# Patient Record
Sex: MALE | Race: WHITE | URBAN - METROPOLITAN AREA
[De-identification: names, ages, dates, MRNs, and addresses within clinical notes are randomized per-mention and may not be internally consistent; named-entity substitution may affect disease eponyms.]

---

## 2017-05-10 RX ORDER — AMLODIPINE AND BENAZEPRIL HYDROCHLORIDE 5; 40 MG/1; MG/1
CAPSULE ORAL
Qty: 90 CAP | Refills: 0 | Status: SHIPPED | OUTPATIENT
Start: 2017-05-10 | End: 2017-08-10 | Stop reason: SDUPTHER

## 2017-05-25 DIAGNOSIS — I10 ESSENTIAL HYPERTENSION WITH GOAL BLOOD PRESSURE LESS THAN 140/90: ICD-10-CM

## 2017-05-25 RX ORDER — METOPROLOL SUCCINATE 50 MG/1
TABLET, EXTENDED RELEASE ORAL
Qty: 90 TAB | Refills: 0 | Status: SHIPPED | OUTPATIENT
Start: 2017-05-25 | End: 2017-08-28 | Stop reason: SDUPTHER

## 2017-05-25 NOTE — TELEPHONE ENCOUNTER
He is due in for annual CHP and fasting lab with next open slot. He can have lab and CHP done on the same AM visit.  He was in the office in November and his blood pressure was 149/89

## 2017-08-10 RX ORDER — AMLODIPINE AND BENAZEPRIL HYDROCHLORIDE 5; 40 MG/1; MG/1
CAPSULE ORAL
Qty: 90 CAP | Refills: 0 | Status: SHIPPED | OUTPATIENT
Start: 2017-08-10 | End: 2017-11-08 | Stop reason: SDUPTHER

## 2017-08-28 DIAGNOSIS — I10 ESSENTIAL HYPERTENSION WITH GOAL BLOOD PRESSURE LESS THAN 140/90: ICD-10-CM

## 2017-08-29 RX ORDER — METOPROLOL SUCCINATE 50 MG/1
TABLET, EXTENDED RELEASE ORAL
Qty: 90 TAB | Refills: 0 | Status: SHIPPED | OUTPATIENT
Start: 2017-08-29 | End: 2017-11-30 | Stop reason: SDUPTHER

## 2017-09-18 PROBLEM — R73.03 PREDIABETES: Status: ACTIVE | Noted: 2017-09-18

## 2017-10-02 ENCOUNTER — LAB ONLY (OUTPATIENT)
Dept: FAMILY MEDICINE CLINIC | Age: 57
End: 2017-10-02

## 2017-10-02 DIAGNOSIS — D64.9 ANEMIA, UNSPECIFIED TYPE: ICD-10-CM

## 2017-10-02 DIAGNOSIS — Z12.5 PROSTATE CANCER SCREENING: ICD-10-CM

## 2017-10-02 DIAGNOSIS — I10 ESSENTIAL HYPERTENSION: Primary | ICD-10-CM

## 2017-10-02 DIAGNOSIS — R73.03 PREDIABETES: ICD-10-CM

## 2017-10-02 DIAGNOSIS — E55.9 VITAMIN D DEFICIENCY: ICD-10-CM

## 2017-10-02 NOTE — LETTER
10/9/2017 8:07 AM 
 
Mr. John Menchaca [id number] Dear Zbigniew Narayanan.: 
 
Please find your most recent results below. Resulted Orders VITAMIN D, 25 HYDROXY Result Value Ref Range VITAMIN D, 25-HYDROXY 35.0 30.0 - 100.0 ng/mL Comment:  
   Vitamin D deficiency has been defined by the [address] [place] [address] practice guideline as a 
level of serum 25-OH vitamin D less than 20 ng/mL (1,2). The Endocrine Society went on to further define vitamin D 
insufficiency as a level between 21 and 29 ng/mL (2). 1. IOM ([place] of [employer]). 2010. Dietary reference 
   intakes for calcium and D. [address] [facility]: The 
   [place]. 2. Winter MF, Zheng WINTER, Lg LOVE, et al. 
   Evaluation, treatment, and prevention of vitamin D 
   deficiency: an Endocrine Society clinical practice 
   guideline. JCEM. 2011 Jul; 96(7):1911-30. Narrative Performed at:  [address]  [id number] : Dolly Martinez MD, Phone:  [phone] HEMOGLOBIN A1C WITH EAG Result Value Ref Range Hemoglobin A1c 5.3 4.8 - 5.6 % Comment:  
            Pre-diabetes: 5.7 - 6.4 Diabetes: >6.4 Glycemic control for adults with diabetes: <7.0 Estimated average glucose 105 mg/dL Narrative Performed at:  [address]  [id number] : Dolly Martinez MD, Phone:  [phone] FERRITIN Result Value Ref Range Ferritin 239 30 - 400 ng/mL Narrative Performed at:  [address]  [id number] : Dolly Martinez MD, Phone:  [phone] CBC WITH AUTOMATED DIFF Result Value Ref Range WBC 6.9 3.4 - 10.8 x10E3/uL  
 RBC 3.95 (L) 4.14 - 5.80 x10E6/uL HGB 12.8 12.6 - 17.7 g/dL HCT 36.7 (L) 37.5 - 51.0 %  MCV 93 79 - 97 fL  
 MCH 32.4 26.6 - 33.0 pg  
 MCHC 34.9 31.5 - 35.7 g/dL  
 RDW 13.5 12.3 - 15.4 % PLATELET 143 654 - 372 x10E3/uL NEUTROPHILS 66 Not Estab. % Lymphocytes 22 Not Estab. % MONOCYTES 11 Not Estab. % EOSINOPHILS 1 Not Estab. % BASOPHILS 0 Not Estab. %  
 ABS. NEUTROPHILS 4.5 1.4 - 7.0 x10E3/uL Abs Lymphocytes 1.5 0.7 - 3.1 x10E3/uL  
 ABS. MONOCYTES 0.7 0.1 - 0.9 x10E3/uL  
 ABS. EOSINOPHILS 0.1 0.0 - 0.4 x10E3/uL  
 ABS. BASOPHILS 0.0 0.0 - 0.2 x10E3/uL IMMATURE GRANULOCYTES 0 Not Estab. %  
 ABS. IMM. GRANS. 0.0 0.0 - 0.1 x10E3/uL Narrative Performed at:  [address]  [id number] : Moreno Banegas MD, Phone:  [phone] URINALYSIS W/ RFLX MICROSCOPIC Result Value Ref Range Specific Gravity 1.009 1.005 - 1.030  
 pH (UA) 6.0 5.0 - 7.5 Color Yellow Yellow Appearance Clear Clear Leukocyte Esterase Negative Negative Protein 1+ (A) Negative/Trace Glucose Negative Negative Ketone Negative Negative Blood Negative Negative Bilirubin Negative Negative Urobilinogen 0.2 0.2 - 1.0 mg/dL Nitrites Negative Negative Microscopic Examination See additional order Comment:  
   Microscopic was indicated and was performed. Narrative Performed at:  [address]  [id number] : Moerno Banegas MD, Phone:  [phone] TSH 3RD GENERATION Result Value Ref Range TSH 0.790 0.450 - 4.500 uIU/mL Narrative Performed at:  [address]  [id number] : Moreno Banegas MD, Phone:  [phone] PSA, DIAGNOSTIC (PROSTATE SPECIFIC AG) Result Value Ref Range Prostate Specific Ag 0.4 0.0 - 4.0 ng/mL Comment:  
   Roche ECLIA methodology.  
According to the American Urological Association, Serum PSA should 
decrease and remain at undetectable levels after radical 
 prostatectomy. The AUA defines biochemical recurrence as an initial 
PSA value 0.2 ng/mL or greater followed by a subsequent confirmatory PSA value 0.2 ng/mL or greater. Values obtained with different assay methods or kits cannot be used 
interchangeably. Results cannot be interpreted as absolute evidence 
of the presence or absence of malignant disease. Narrative Performed at:  [address]  [id number] : Leann Zazueta MD, Phone:  [phone] METABOLIC PANEL, COMPREHENSIVE Result Value Ref Range Glucose 107 (H) 65 - 99 mg/dL BUN 12 6 - 24 mg/dL Creatinine 0.97 0.76 - 1.27 mg/dL GFR est non-AA 86 >59 mL/min/1.73 GFR est AA 99 >59 mL/min/1.73  
 BUN/Creatinine ratio 12 9 - 20 Sodium 133 (L) 134 - 144 mmol/L Potassium 5.1 3.5 - 5.2 mmol/L Chloride 97 96 - 106 mmol/L  
 CO2 19 18 - 29 mmol/L Calcium 9.1 8.7 - 10.2 mg/dL Protein, total 7.0 6.0 - 8.5 g/dL Albumin 4.6 3.5 - 5.5 g/dL GLOBULIN, TOTAL 2.4 1.5 - 4.5 g/dL A-G Ratio 1.9 1.2 - 2.2 Bilirubin, total 0.5 0.0 - 1.2 mg/dL Alk. phosphatase 70 39 - 117 IU/L  
 AST (SGOT) 31 0 - 40 IU/L  
 ALT (SGPT) 29 0 - 44 IU/L Narrative Performed at:  [address]  [id number] : Leann Zazueta MD, Phone:  [phone] LIPID PANEL Result Value Ref Range Cholesterol, total 138 100 - 199 mg/dL Triglyceride 54 0 - 149 mg/dL HDL Cholesterol 79 >39 mg/dL VLDL, calculated 11 5 - 40 mg/dL LDL, calculated 48 0 - 99 mg/dL Narrative Performed at:  [address]  [id number] : Leann Zazueta MD, Phone:  [phone] MICROSCOPIC EXAMINATION Result Value Ref Range WBC 0-5 0 - 5 /hpf  
 RBC None seen 0 - 2 /hpf Epithelial cells 0-10 0 - 10 /hpf Casts None seen None seen /lpf Bacteria None seen None seen/Few Narrative Performed at:  [address]  [id number] : George Sexton MD, Phone:  [phone] CVD REPORT Result Value Ref Range INTERPRETATION Note Comment:  
   Supplement report is available. Narrative Performed at:  [address] [place] [address]  [id number] : Jossy Moore PhD, Phone:  [phone] RECOMMENDATIONS: 
 
Normal average blood sugar. Mildly decreased hematocrit with normal iron level. Proteinuria. The rest of your labs are ok. Please call me if you have any questions: [phone] Sincerely, Maxine Cowan MD

## 2017-10-03 PROBLEM — R73.03 PREDIABETES: Status: RESOLVED | Noted: 2017-09-18 | Resolved: 2017-10-03

## 2017-10-03 LAB
25(OH)D3+25(OH)D2 SERPL-MCNC: 35 NG/ML (ref 30–100)
ALBUMIN SERPL-MCNC: 4.6 G/DL (ref 3.5–5.5)
ALBUMIN/GLOB SERPL: 1.9 {RATIO} (ref 1.2–2.2)
ALP SERPL-CCNC: 70 IU/L (ref 39–117)
ALT SERPL-CCNC: 29 IU/L (ref 0–44)
APPEARANCE UR: CLEAR
AST SERPL-CCNC: 31 IU/L (ref 0–40)
BACTERIA #/AREA URNS HPF: NORMAL /[HPF]
BASOPHILS # BLD AUTO: 0 X10E3/UL (ref 0–0.2)
BASOPHILS NFR BLD AUTO: 0 %
BILIRUB SERPL-MCNC: 0.5 MG/DL (ref 0–1.2)
BILIRUB UR QL STRIP: NEGATIVE
BUN SERPL-MCNC: 12 MG/DL (ref 6–24)
BUN/CREAT SERPL: 12 (ref 9–20)
CALCIUM SERPL-MCNC: 9.1 MG/DL (ref 8.7–10.2)
CASTS URNS QL MICRO: NORMAL /LPF
CHLORIDE SERPL-SCNC: 97 MMOL/L (ref 96–106)
CHOLEST SERPL-MCNC: 138 MG/DL (ref 100–199)
CO2 SERPL-SCNC: 19 MMOL/L (ref 18–29)
COLOR UR: YELLOW
CREAT SERPL-MCNC: 0.97 MG/DL (ref 0.76–1.27)
EOSINOPHIL # BLD AUTO: 0.1 X10E3/UL (ref 0–0.4)
EOSINOPHIL NFR BLD AUTO: 1 %
EPI CELLS #/AREA URNS HPF: NORMAL /HPF
ERYTHROCYTE [DISTWIDTH] IN BLOOD BY AUTOMATED COUNT: 13.5 % (ref 12.3–15.4)
EST. AVERAGE GLUCOSE BLD GHB EST-MCNC: 105 MG/DL
FERRITIN SERPL-MCNC: 239 NG/ML (ref 30–400)
GLOBULIN SER CALC-MCNC: 2.4 G/DL (ref 1.5–4.5)
GLUCOSE SERPL-MCNC: 107 MG/DL (ref 65–99)
GLUCOSE UR QL: NEGATIVE
HBA1C MFR BLD: 5.3 % (ref 4.8–5.6)
HCT VFR BLD AUTO: 36.7 % (ref 37.5–51)
HDLC SERPL-MCNC: 79 MG/DL
HGB BLD-MCNC: 12.8 G/DL (ref 12.6–17.7)
HGB UR QL STRIP: NEGATIVE
IMM GRANULOCYTES # BLD: 0 X10E3/UL (ref 0–0.1)
IMM GRANULOCYTES NFR BLD: 0 %
INTERPRETATION, 910389: NORMAL
KETONES UR QL STRIP: NEGATIVE
LDLC SERPL CALC-MCNC: 48 MG/DL (ref 0–99)
LEUKOCYTE ESTERASE UR QL STRIP: NEGATIVE
LYMPHOCYTES # BLD AUTO: 1.5 X10E3/UL (ref 0.7–3.1)
LYMPHOCYTES NFR BLD AUTO: 22 %
MCH RBC QN AUTO: 32.4 PG (ref 26.6–33)
MCHC RBC AUTO-ENTMCNC: 34.9 G/DL (ref 31.5–35.7)
MCV RBC AUTO: 93 FL (ref 79–97)
MICRO URNS: ABNORMAL
MONOCYTES # BLD AUTO: 0.7 X10E3/UL (ref 0.1–0.9)
MONOCYTES NFR BLD AUTO: 11 %
NEUTROPHILS # BLD AUTO: 4.5 X10E3/UL (ref 1.4–7)
NEUTROPHILS NFR BLD AUTO: 66 %
NITRITE UR QL STRIP: NEGATIVE
PH UR STRIP: 6 [PH] (ref 5–7.5)
PLATELET # BLD AUTO: 247 X10E3/UL (ref 150–379)
POTASSIUM SERPL-SCNC: 5.1 MMOL/L (ref 3.5–5.2)
PROT SERPL-MCNC: 7 G/DL (ref 6–8.5)
PROT UR QL STRIP: ABNORMAL
PSA SERPL-MCNC: 0.4 NG/ML (ref 0–4)
RBC # BLD AUTO: 3.95 X10E6/UL (ref 4.14–5.8)
RBC #/AREA URNS HPF: NORMAL /HPF
SODIUM SERPL-SCNC: 133 MMOL/L (ref 134–144)
SP GR UR: 1.01 (ref 1–1.03)
TRIGL SERPL-MCNC: 54 MG/DL (ref 0–149)
TSH SERPL DL<=0.005 MIU/L-ACNC: 0.79 UIU/ML (ref 0.45–4.5)
UROBILINOGEN UR STRIP-MCNC: 0.2 MG/DL (ref 0.2–1)
VLDLC SERPL CALC-MCNC: 11 MG/DL (ref 5–40)
WBC # BLD AUTO: 6.9 X10E3/UL (ref 3.4–10.8)
WBC #/AREA URNS HPF: NORMAL /HPF

## 2017-10-09 ENCOUNTER — OFFICE VISIT (OUTPATIENT)
Dept: FAMILY MEDICINE CLINIC | Age: 57
End: 2017-10-09

## 2017-10-09 VITALS
BODY MASS INDEX: 23.58 KG/M2 | RESPIRATION RATE: 16 BRPM | HEART RATE: 84 BPM | SYSTOLIC BLOOD PRESSURE: 158 MMHG | TEMPERATURE: 98.5 F | WEIGHT: 138.1 LBS | OXYGEN SATURATION: 97 % | HEIGHT: 64 IN | DIASTOLIC BLOOD PRESSURE: 81 MMHG

## 2017-10-09 DIAGNOSIS — I10 ELEVATED BLOOD PRESSURE READING IN OFFICE WITH DIAGNOSIS OF HYPERTENSION: ICD-10-CM

## 2017-10-09 DIAGNOSIS — N52.9 ERECTILE DYSFUNCTION, UNSPECIFIED ERECTILE DYSFUNCTION TYPE: ICD-10-CM

## 2017-10-09 DIAGNOSIS — F17.210 CIGARETTE SMOKER: ICD-10-CM

## 2017-10-09 DIAGNOSIS — E55.9 VITAMIN D DEFICIENCY: ICD-10-CM

## 2017-10-09 DIAGNOSIS — I10 ESSENTIAL HYPERTENSION: ICD-10-CM

## 2017-10-09 DIAGNOSIS — R09.89 LEFT CAROTID BRUIT: ICD-10-CM

## 2017-10-09 DIAGNOSIS — D64.9 ANEMIA, UNSPECIFIED TYPE: ICD-10-CM

## 2017-10-09 DIAGNOSIS — Z00.00 PHYSICAL EXAM: Primary | ICD-10-CM

## 2017-10-09 NOTE — PROGRESS NOTES
Hypertension Sister      Social History   Substance Use Topics    Smoking status: Current Every Day Smoker     Packs/day: 1.00     Types: Cigarettes    Smokeless tobacco: Never Used      Comment: may have to quit due to the cost    Alcohol use 12.0 oz/week     24 Cans of beer per week      Lab Results  Component Value Date/Time   WBC 6.9 10/02/2017 08:46 AM   HGB 12.8 10/02/2017 08:46 AM   HCT 36.7 10/02/2017 08:46 AM   PLATELET 060 63/19/2768 08:46 AM   MCV 93 10/02/2017 08:46 AM     Lab Results  Component Value Date/Time   Hemoglobin A1c 5.3 10/02/2017 08:46 AM   Hemoglobin A1c 5.8 06/08/2016 09:33 AM   Hemoglobin A1c 5.9 05/18/2016 09:35 AM   Glucose 107 10/02/2017 08:46 AM   LDL, calculated 48 10/02/2017 08:46 AM   Creatinine 0.97 10/02/2017 08:46 AM      Lab Results  Component Value Date/Time   Cholesterol, total 138 10/02/2017 08:46 AM   HDL Cholesterol 79 10/02/2017 08:46 AM   LDL, calculated 48 10/02/2017 08:46 AM   Triglyceride 54 10/02/2017 08:46 AM   CHOL/HDL Ratio 3.2 10/09/2009 08:00 AM   Lab Results  Component Value Date/Time   ALT (SGPT) 29 10/02/2017 08:46 AM   AST (SGOT) 31 10/02/2017 08:46 AM   Alk.  phosphatase 70 10/02/2017 08:46 AM   Bilirubin, total 0.5 10/02/2017 08:46 AM   Albumin 4.6 10/02/2017 08:46 AM   Protein, total 7.0 10/02/2017 08:46 AM   PLATELET 328 91/15/5157 08:46 AM       Lab Results  Component Value Date/Time   GFR est non-AA 86 10/02/2017 08:46 AM   GFR est AA 99 10/02/2017 08:46 AM   Creatinine 0.97 10/02/2017 08:46 AM   BUN 12 10/02/2017 08:46 AM   Sodium 133 10/02/2017 08:46 AM   Potassium 5.1 10/02/2017 08:46 AM   Chloride 97 10/02/2017 08:46 AM   CO2 19 10/02/2017 08:46 AM   Lab Results  Component Value Date/Time   TSH 0.790 10/02/2017 08:46 AM      Lab Results   Component Value Date/Time    Glucose 107 10/02/2017 08:46 AM      Lab Results   Component Value Date/Time    Prostate Specific Ag 0.4 10/02/2017 08:46 AM    Prostate Specific Ag 0.4 05/18/2016 09:35 AM Prostate Specific Ag 0.6 12/29/2014 09:04 AM    Prostate Specific Ag 0.4 10/09/2009 08:00 AM        Review of Systems   Constitutional: Negative. HENT: Negative. Eyes: Negative. Respiratory: Positive for cough. Cardiovascular: Negative. Gastrointestinal: Negative. Genitourinary: Negative. Musculoskeletal: Positive for joint pain. Right hip. Skin: Negative. Neurological: Negative. Endo/Heme/Allergies: Negative. Psychiatric/Behavioral: Negative. Physical Exam   Vitals:    10/09/17 1019   BP: 158/81   Pulse: 84   Resp: 16   Temp: 98.5 °F (36.9 °C)   TempSrc: Oral   SpO2: 97%   Weight: 138 lb 1.6 oz (62.6 kg)   Height: 5' 3.5\" (1.613 m)       General  alert, cooperative, no distress, appears stated age   Head  Normocephalic, without obvious abnormality, atraumatic   Eyes  conjunctivae/corneas clear. PERRL. Fundi pale from prev cataract surg. Ears  Canals and TMs clear   Nose Passages patent. Mucosa normal. No drainage or sinus tenderness. Throat Lips, mucosa, and tongue normal. Full upper, partial lower, gums normal.  Post pharynx erythem. Neck supple, nontender, no adenopathy, thyroid: not enlarged, no masses/nodules, left carotid bruit   Back   symmetric, no curvature. FROM. No CVA tenderness   Lungs   clear to auscultation bilaterally   Chest wall  no tenderness   Heart  regular rate and rhythm, no murmur, click, rub or gallop   Abdomen   soft, non-tender. Bowel sounds normal. No masses,  No organomegaly   Genitalia  Testes descended bilat w/o masses. No hernias   Rectal  Pt declined   Extremities extremities normal, atraumatic, no cyanosis or edema   Pulses 1-2+ and symmetric   Skin No rashes or lesions       Neurologic Romberg neg, nml heel, toe and Tandem gait. DTRs 2-3+ symmetric         ASSESSMENT and PLAN    ICD-10-CM ICD-9-CM    1. Physical exam Z00.00 V70.9    2. Anemia, unspecified type D64.9 285.9    3. Vitamin D deficiency E55.9 268.9    4.  Erectile dysfunction, unspecified erectile dysfunction type N52.9 607.84    5. Cigarette smoker F17.210 305.1    6. Essential hypertension I10 401.9    7. Elevated blood pressure reading in office with diagnosis of hypertension I10 401.9    8. Left carotid bruit R09.89 785.9 DUPLEX CAROTID BILATERAL   Advised to begin daily LS ASA. Follow-up Disposition:  Return in about 1 year (around 10/9/2018) for [facility] and labs.

## 2017-10-09 NOTE — LETTER
10/9/2017 10:23 AM 
 
Mr. Mariah Randolph. 
[facility] [phone] Lab Only on 10/02/2017 Component Date Value Ref Range Status  VITAMIN D, 25-HYDROXY 10/02/2017 35.0  30.0 - 100.0 ng/mL Final  
 Hemoglobin A1c 10/02/2017 5.3  4.8 - 5.6 % Final  
 Estimated average glucose 10/02/2017 105  mg/dL Final  
 Ferritin 10/02/2017 239  30 - 400 ng/mL Final  
 WBC 10/02/2017 6.9  3.4 - 10.8 x10E3/uL Final  
 RBC 10/02/2017 3.95* 4.14 - 5.80 x10E6/uL Final  
 HGB 10/02/2017 12.8  12.6 - 17.7 g/dL Final  
 HCT 10/02/2017 36.7* 37.5 - 51.0 % Final  
 MCV 10/02/2017 93  79 - 97 fL Final  
 MCH 10/02/2017 32.4  26.6 - 33.0 pg Final  
 MCHC 10/02/2017 34.9  31.5 - 35.7 g/dL Final  
 RDW 10/02/2017 13.5  12.3 - 15.4 % Final  
 PLATELET 80/24/7049 016  150 - 379 x10E3/uL Final  
 NEUTROPHILS 10/02/2017 66  Not Estab. % Final  
 Lymphocytes 10/02/2017 22  Not Estab. % Final  
 MONOCYTES 10/02/2017 11  Not Estab. % Final  
 EOSINOPHILS 10/02/2017 1  Not Estab. % Final  
 BASOPHILS 10/02/2017 0  Not Estab. % Final  
 ABS. NEUTROPHILS 10/02/2017 4.5  1.4 - 7.0 x10E3/uL Final  
 Abs Lymphocytes 10/02/2017 1.5  0.7 - 3.1 x10E3/uL Final  
 ABS. MONOCYTES 10/02/2017 0.7  0.1 - 0.9 x10E3/uL Final  
 ABS. EOSINOPHILS 10/02/2017 0.1  0.0 - 0.4 x10E3/uL Final  
 ABS. BASOPHILS 10/02/2017 0.0  0.0 - 0.2 x10E3/uL Final  
 IMMATURE GRANULOCYTES 10/02/2017 0  Not Estab. % Final  
 ABS. IMM. GRANS.  10/02/2017 0.0  0.0 - 0.1 x10E3/uL Final  
 Specific Gravity 10/02/2017 1.009  1.005 - 1.030 Final  
 pH (UA) 10/02/2017 6.0  5.0 - 7.5 Final  
 Color 10/02/2017 Yellow  Yellow Final  
 Appearance 10/02/2017 Clear  Clear Final  
 Leukocyte Esterase 10/02/2017 Negative  Negative Final  
 Protein 10/02/2017 1+* Negative/Trace Final  
 Glucose 10/02/2017 Negative  Negative Final  
 Ketone 10/02/2017 Negative  Negative Final  
 Blood 10/02/2017 Negative  Negative Final  
  Bilirubin 10/02/2017 Negative  Negative Final  
 Urobilinogen 10/02/2017 0.2  0.2 - 1.0 mg/dL Final  
 Nitrites 10/02/2017 Negative  Negative Final  
 Microscopic Examination 10/02/2017 See additional order   Final  
 TSH 10/02/2017 0.790  0.450 - 4.500 uIU/mL Final  
 Prostate Specific Ag 10/02/2017 0.4  0.0 - 4.0 ng/mL Final  
 Glucose 10/02/2017 107* 65 - 99 mg/dL Final  
 BUN 10/02/2017 12  6 - 24 mg/dL Final  
 Creatinine 10/02/2017 0.97  0.76 - 1.27 mg/dL Final  
 GFR est non-AA 10/02/2017 86  >59 mL/min/1.73 Final  
 GFR est AA 10/02/2017 99  >59 mL/min/1.73 Final  
 BUN/Creatinine ratio 10/02/2017 12  9 - 20 Final  
 Sodium 10/02/2017 133* 134 - 144 mmol/L Final  
 Potassium 10/02/2017 5.1  3.5 - 5.2 mmol/L Final  
 Chloride 10/02/2017 97  96 - 106 mmol/L Final  
 CO2 10/02/2017 19  18 - 29 mmol/L Final  
 Calcium 10/02/2017 9.1  8.7 - 10.2 mg/dL Final  
 Protein, total 10/02/2017 7.0  6.0 - 8.5 g/dL Final  
 Albumin 10/02/2017 4.6  3.5 - 5.5 g/dL Final  
 GLOBULIN, TOTAL 10/02/2017 2.4  1.5 - 4.5 g/dL Final  
 A-G Ratio 10/02/2017 1.9  1.2 - 2.2 Final  
 Bilirubin, total 10/02/2017 0.5  0.0 - 1.2 mg/dL Final  
 Alk. phosphatase 10/02/2017 70  39 - 117 IU/L Final  
 AST (SGOT) 10/02/2017 31  0 - 40 IU/L Final  
 ALT (SGPT) 10/02/2017 29  0 - 44 IU/L Final  
 Cholesterol, total 10/02/2017 138  100 - 199 mg/dL Final  
 Triglyceride 10/02/2017 54  0 - 149 mg/dL Final  
 HDL Cholesterol 10/02/2017 79  >39 mg/dL Final  
 VLDL, calculated 10/02/2017 11  5 - 40 mg/dL Final  
 LDL, calculated 10/02/2017 48  0 - 99 mg/dL Final  
 WBC 10/02/2017 0-5  0 - 5 /hpf Final  
 RBC 10/02/2017 None seen  0 - 2 /hpf Final  
 Epithelial cells 10/02/2017 0-10  0 - 10 /hpf Final  
 Casts 10/02/2017 None seen  None seen /lpf Final  
 Bacteria 10/02/2017 None seen  None seen/Few Final  
 INTERPRETATION 10/02/2017 Note   Final  
 
 
 
 
Sincerely, Ramin Roque MD

## 2017-10-09 NOTE — PROGRESS NOTES
Chief Complaint   Patient presents with    Complete Physical     1. Have you been to the ER, urgent care clinic since your last visit? Hospitalized since your last visit? Yes When: 9/17 [facility] Reason for visit: Bronchitis    2. Have you seen or consulted any other health care providers outside of the [address] [place] [address] since your last visit? Include any pap smears or colon screening. No     Advance Care Planning information reviewed and given to the patient. I have reviewed Health Maintenance with the patient and updated. Complete Physical Exam Male  Pre-Visit Questions:    1. Do you follow a low fat or low salt diet ? y  2. Do you follow an exercise program? y  3. Have you had your tetanus booster in the last 10 years? y  3. Have you ever had a Pneumonia vaccine? n  5. Do you smoke?y   6. Do you consider yourself overweight? n  7. Do you perform Testicular self exam?n  8. Is there a family history of CAD< age 48? Maybe his Father  5. Is there a family history of Cancer? n  10. Do you have any Cancer risks? n  11. Have you had a colonoscopy? y  15. Have you been to your eye doctor past year?   n  15. Have you been to your dentist in the last 6 months?  n  14. Have you had your flu shot for this season? n

## 2017-10-09 NOTE — PROGRESS NOTES
Letter sent with Dr. Behzad Jacob recommendations:   Normal average blood sugar. Mildly decreased hematocrit with normal Iron level. Proteinuria.   The rest of your labs are all O.K.

## 2017-10-09 NOTE — MR AVS SNAPSHOT
Visit Information Date & Time Provider Department Dept. Phone Encounter #  
 10/9/2017 10:20 AM Jared Cortes MD [facility] [phone] [id number] Follow-up Instructions Return in about 1 year (around 10/9/2018) for [facility] and labs. Upcoming Health Maintenance Date Due INFLUENZA AGE 9 TO ADULT 10/21/2017* COLONOSCOPY 11/3/2021 DTaP/Tdap/Td series (2 - Td) 6/28/2023 *Topic was postponed. The date shown is not the original due date. Allergies as of 10/9/2017  Review Complete On: 10/9/2017 By: Jared Cortes MD  
  
 Severity Noted Reaction Type Reactions Hydrodiuril  12/01/2009    Other (comments) Decreased sodium level Current Immunizations  Reviewed on 6/28/2013 Name Date  
 TD Vaccine 3/28/2003 Tdap 6/28/2013 Not reviewed this visit You Were Diagnosed With   
  
 Codes Comments Physical exam    -  Primary ICD-10-CM: Z00.00 ICD-9-CM: V70.9 Anemia, unspecified type     ICD-10-CM: D64.9 ICD-9-CM: 296. 9 Vitamin D deficiency     ICD-10-CM: E55.9 ICD-9-CM: 268.9 Erectile dysfunction, unspecified erectile dysfunction type     ICD-10-CM: N52.9 ICD-9-CM: 607.84 Cigarette smoker     ICD-10-CM: F17.210 ICD-9-CM: 305.1 Essential hypertension     ICD-10-CM: I10 
ICD-9-CM: 401.9 Elevated blood pressure reading in office with diagnosis of hypertension     ICD-10-CM: I10 
ICD-9-CM: 401.9 Vitals BP Pulse Temp Resp Height(growth percentile) Weight(growth percentile) 158/81 (BP 1 Location: Right arm, BP Patient Position: Sitting) 84 98.5 °F (36.9 °C) (Oral) 16 5' 3.5\" (1.613 m) 138 lb 1.6 oz (62.6 kg) SpO2 BMI Smoking Status 97% 24.08 kg/m2 Current Every Day Smoker Vitals History BMI and BSA Data Body Mass Index Body Surface Area 24.08 kg/m 2 1.67 m 2 Preferred Pharmacy Pharmacy Name Phone [facility] #[id number] Kody [address] [place] [address] [phone] Your Updated Medication List  
  
   
This list is accurate as of: 10/9/17 10:55 AM.  Always use your most recent med list. ADVIL 200 mg tablet Generic drug:  ibuprofen Take 400 mg by mouth as needed for Pain. amLODIPine-benazepril 5-40 mg per capsule Commonly known as:  LOTREL  
TAKE 1 CAP BY MOUTH DAILY. metoprolol succinate 50 mg XL tablet Commonly known as:  TOPROL-XL  
TAKE 1 TABLET BY MOUTH DAILY Follow-up Instructions Return in about 1 year (around 10/9/2018) for [facility] and labs. Introducing [facility]! Dear Jaycee Tobias: 
Thank you for requesting a "[employer] account. Our records indicate that you have previously registered for a "[employer]" account but its currently inactive. Please call our "[employer]" support line at [phone]. Additional Information If you have questions, please visit the Frequently Asked Questions section of the "[employer]" website at [email or web address] Remember, "[employer]" is NOT to be used for urgent needs. For medical emergencies, dial 911. Now available from your iPhone and Android! Please provide this summary of care documentation to your next provider. Your primary care clinician is listed as [id number] If you have any questions after today's visit, please call [phone].

## 2018-06-07 ENCOUNTER — OFFICE VISIT (OUTPATIENT)
Dept: FAMILY MEDICINE CLINIC | Age: 58
End: 2018-06-07

## 2018-06-07 VITALS
OXYGEN SATURATION: 99 % | HEART RATE: 90 BPM | RESPIRATION RATE: 18 BRPM | HEIGHT: 64 IN | TEMPERATURE: 98.4 F | WEIGHT: 137.9 LBS | DIASTOLIC BLOOD PRESSURE: 88 MMHG | SYSTOLIC BLOOD PRESSURE: 174 MMHG | BODY MASS INDEX: 23.54 KG/M2

## 2018-06-07 DIAGNOSIS — I10 ELEVATED BLOOD PRESSURE READING IN OFFICE WITH DIAGNOSIS OF HYPERTENSION: ICD-10-CM

## 2018-06-07 DIAGNOSIS — F17.210 CIGARETTE SMOKER: ICD-10-CM

## 2018-06-07 DIAGNOSIS — N52.9 ERECTILE DYSFUNCTION, UNSPECIFIED ERECTILE DYSFUNCTION TYPE: Primary | ICD-10-CM

## 2018-06-07 DIAGNOSIS — I10 ESSENTIAL HYPERTENSION: ICD-10-CM

## 2018-06-07 RX ORDER — AZITHROMYCIN 250 MG/1
500 TABLET, FILM COATED ORAL DAILY
Qty: 6 TAB | Refills: 0 | Status: SHIPPED | OUTPATIENT
Start: 2018-06-07 | End: 2018-06-10

## 2018-06-07 RX ORDER — SILDENAFIL 100 MG/1
50-100 TABLET, FILM COATED ORAL AS NEEDED
Qty: 10 TAB | Refills: 0 | Status: SHIPPED | OUTPATIENT
Start: 2018-06-07 | End: 2020-11-06

## 2018-06-07 NOTE — PROGRESS NOTES
Dustin Tirado. is a 59 y.o. male  Chief Complaint   Patient presents with    Medication Refill     [facility] [address] [facility] [address] [place] [address]     1. Have you been to the ER, urgent care clinic since your last visit? Hospitalized since your last visit? Yes [facility] on [address] [place] for swollen jaw 6-7 months ago, no problems since. Patient believes it may be connected to salted peanuts eaten night prior. Physician suggested may be related to BP med    2. Have you seen or consulted any other health care providers outside of the [address] [place] [address] since your last visit? Include any pap smears or colon screening. None other than above mentioned    Visit Vitals    /88 (BP 1 Location: Left arm, BP Patient Position: Sitting)    Pulse 90    Temp 98.4 °F (36.9 °C) (Oral)    Resp 18    Ht 5' 3.5\" (1.613 m)    Wt 137 lb 14.4 oz (62.6 kg)    SpO2 99%    BMI 24.04 kg/m2     BP elevated 161/119 on right arm. Patient reports stress at work. Rechecked on left arm for reading listed above. BP medications have been taken this morning. Provider notified.

## 2018-06-07 NOTE — MR AVS SNAPSHOT
[address] [place] 
[address] [place] [address] 
[phone] Patient: Jaquelin Loza. MRN: [id number] [facility] Visit Information Date & Time Provider Department Dept. Phone Encounter #  
 2018 10:40 AM Merline Sims, MD [facility] 21  Follow-up Instructions Return in about 4 months (around 10/7/2018) for [facility], labs. Upcoming Health Maintenance Date Due Influenza Age 5 to Adult 2018 COLONOSCOPY 11/3/2021 DTaP/Tdap/Td series (2 - Td) 2023 Allergies as of 2018  Review Complete On: 2018 By: Ann Pineda RN Severity Noted Reaction Type Reactions Hydrodiuril  2009    Other (comments) Decreased sodium level Current Immunizations  Reviewed on 2013 Name Date  
 TD Vaccine 3/28/2003 Tdap 2013 Not reviewed this visit You Were Diagnosed With   
  
 Codes Comments Erectile dysfunction, unspecified erectile dysfunction type    -  Primary ICD-10-CM: N52.9 ICD-9-CM: 607.84 Cigarette smoker     ICD-10-CM: F17.210 ICD-9-CM: 305.1 Elevated blood pressure reading in office with diagnosis of hypertension     ICD-10-CM: I10 
ICD-9-CM: 401.9 Essential hypertension     ICD-10-CM: I10 
ICD-9-CM: 401.9 Vitals BP Pulse Temp Resp Height(growth percentile) Weight(growth percentile) 174/88 (BP 1 Location: Left arm, BP Patient Position: Sitting) 90 98.4 °F (36.9 °C) (Oral) 18 5' 3.5\" (1.613 m) 137 lb 14.4 oz (62.6 kg) SpO2 BMI Smoking Status 99% 24.04 kg/m2 Current Every Day Smoker Vitals History BMI and BSA Data Body Mass Index Body Surface Area 24.04 kg/m 2 1.67 m 2 Preferred Pharmacy Pharmacy Name Phone [id number] [phone] Your Updated Medication List  
  
   
 This list is accurate as of 6/7/18 11:03 AM.  Always use your most recent med list. ADVIL 200 mg tablet Generic drug:  ibuprofen Take 400 mg by mouth as needed for Pain. amLODIPine-benazepril 5-40 mg per capsule Commonly known as:  LOTREL  
TAKE 1 CAP BY MOUTH DAILY. ASPIRIN CHILDRENS PO Take  by mouth. azithromycin 250 mg tablet Commonly known as:  Tasha Lower Take 2 Tabs by mouth daily for 3 days. metoprolol succinate 50 mg XL tablet Commonly known as:  TOPROL-XL  
TAKE 1 TABLET BY MOUTH DAILY  
  
 sildenafil citrate 100 mg tablet Commonly known as:  VIAGRA Take 0.5-1 Tabs by mouth as needed. Prescriptions Printed Refills  
 sildenafil citrate (VIAGRA) 100 mg tablet 0 Sig: Take 0.5-1 Tabs by mouth as needed. Class: Print Route: Oral  
  
Prescriptions Sent to Pharmacy Refills  
 azithromycin (ZITHROMAX) 250 mg tablet 0 Sig: Take 2 Tabs by mouth daily for 3 days. Class: Normal  
 Pharmacy: [facility]/pharmacy #[address] [place] [address] Ph #: [phone] Route: Oral  
  
Follow-up Instructions Return in about 4 months (around 10/7/2018) for [facility], [facility]. Introducing [facility] & HEALTH SERVICES! Dear Etelvina Henderson: 
Thank you for requesting a [employer] account. Our records indicate that you have previously registered for a [employer] account but its currently inactive. Please call our [employer] support line at [phone]. Additional Information If you have questions, please visit the Frequently Asked Questions section of the [employer] website at [email or web address]"t/. Remember, [employer] is NOT to be used for urgent needs. For medical emergencies, dial 911. Now available from your iPhone and Android! Please provide this summary of care documentation to your next provider. Your primary care clinician is listed as [address]  If you have any questions after today's visit, please call [phone].

## 2018-06-07 NOTE — PROGRESS NOTES
Plans vacation 2 weeks. Told to get Zpak for long plane flight. Taking 2 LS ASA for DVT prophylaxis on long plane flight. Wants med for ED. Given Cialis in past but never used and . Visit Vitals    /88 (BP 1 Location: Left arm, BP Patient Position: Sitting)    Pulse 90    Temp 98.4 °F (36.9 °C) (Oral)    Resp 18    Ht 5' 3.5\" (1.613 m)    Wt 137 lb 14.4 oz (62.6 kg)    SpO2 99%    BMI 24.04 kg/m2       Patient alert and cooperative. Reviewed above. Assessment:  1. ED, desiring med. 2. Request for antibiotic for treatment of bronchitis if contracts on trip. Plan:  1. Scripts given. 2. Check outpatient BP readings and follow up if persists elevated for med adjustment. 3. Schedule [facility], labs in four months. Follow otherwise here prn.

## 2018-11-12 DIAGNOSIS — I10 ESSENTIAL HYPERTENSION WITH GOAL BLOOD PRESSURE LESS THAN 140/90: ICD-10-CM

## 2018-11-14 RX ORDER — METOPROLOL SUCCINATE 50 MG/1
TABLET, EXTENDED RELEASE ORAL
Qty: 30 TAB | Refills: 0 | Status: SHIPPED | OUTPATIENT
Start: 2018-11-14 | End: 2019-01-03 | Stop reason: SDUPTHER

## 2018-11-14 RX ORDER — AMLODIPINE AND BENAZEPRIL HYDROCHLORIDE 5; 40 MG/1; MG/1
CAPSULE ORAL
Qty: 90 CAP | Refills: 3 | OUTPATIENT
Start: 2018-11-14

## 2018-11-14 NOTE — TELEPHONE ENCOUNTER
PCP: Dangelo Langley MD    Last appt: 6/7/2018  No future appointments. Requested Prescriptions     Pending Prescriptions Disp Refills    amLODIPine-benazepril (LOTREL) 5-40 mg per capsule [Pharmacy Med Name: AMLODIPINE-BENAZEPRIL 5-40 MG] 90 Cap 3     Sig: TAKE 1 CAP BY MOUTH DAILY.     metoprolol succinate (TOPROL-XL) 50 mg XL tablet [Pharmacy Med Name: METOPROLOL SUCC ER 50 MG TAB] 90 Tab 3     Sig: TAKE 1 TABLET BY MOUTH DAILY       Prior labs and Blood pressures:  BP Readings from Last 3 Encounters:   06/07/18 174/88   10/09/17 158/81   10/26/16 148/89     Lab Results   Component Value Date/Time    Sodium 133 (L) 10/02/2017 08:46 AM    Potassium 5.1 10/02/2017 08:46 AM    Chloride 97 10/02/2017 08:46 AM    CO2 19 10/02/2017 08:46 AM    Anion gap 10 10/09/2009 08:00 AM    Glucose 107 (H) 10/02/2017 08:46 AM    BUN 12 10/02/2017 08:46 AM    Creatinine 0.97 10/02/2017 08:46 AM    BUN/Creatinine ratio 12 10/02/2017 08:46 AM    GFR est AA 99 10/02/2017 08:46 AM    GFR est non-AA 86 10/02/2017 08:46 AM    Calcium 9.1 10/02/2017 08:46 AM     Lab Results   Component Value Date/Time    Hemoglobin A1c 5.3 10/02/2017 08:46 AM     Lab Results   Component Value Date/Time    Cholesterol, total 138 10/02/2017 08:46 AM    HDL Cholesterol 79 10/02/2017 08:46 AM    LDL, calculated 48 10/02/2017 08:46 AM    VLDL, calculated 11 10/02/2017 08:46 AM    Triglyceride 54 10/02/2017 08:46 AM    CHOL/HDL Ratio 3.2 10/09/2009 08:00 AM     Lab Results   Component Value Date/Time    VITAMIN D, 25-HYDROXY 35.0 10/02/2017 08:46 AM       Lab Results   Component Value Date/Time    TSH 0.790 10/02/2017 08:46 AM

## 2018-12-17 DIAGNOSIS — D64.9 ANEMIA, UNSPECIFIED TYPE: Primary | ICD-10-CM

## 2018-12-17 DIAGNOSIS — E55.9 VITAMIN D DEFICIENCY: ICD-10-CM

## 2018-12-17 DIAGNOSIS — I10 ESSENTIAL HYPERTENSION: ICD-10-CM

## 2018-12-17 DIAGNOSIS — Z12.5 PROSTATE CANCER SCREENING: ICD-10-CM

## 2018-12-18 LAB
25(OH)D3+25(OH)D2 SERPL-MCNC: 17.8 NG/ML (ref 30–100)
ALBUMIN SERPL-MCNC: 4.5 G/DL (ref 3.6–4.8)
ALBUMIN/GLOB SERPL: 2 {RATIO} (ref 1.2–2.2)
ALP SERPL-CCNC: 78 IU/L (ref 39–117)
ALT SERPL-CCNC: 23 IU/L (ref 0–44)
APPEARANCE UR: CLEAR
AST SERPL-CCNC: 26 IU/L (ref 0–40)
BACTERIA #/AREA URNS HPF: NORMAL /[HPF]
BASOPHILS # BLD AUTO: 0 X10E3/UL (ref 0–0.2)
BASOPHILS NFR BLD AUTO: 0 %
BILIRUB SERPL-MCNC: 0.4 MG/DL (ref 0–1.2)
BILIRUB UR QL STRIP: NEGATIVE
BUN SERPL-MCNC: 17 MG/DL (ref 8–27)
BUN/CREAT SERPL: 14 (ref 10–24)
CALCIUM SERPL-MCNC: 9.3 MG/DL (ref 8.6–10.2)
CASTS URNS QL MICRO: NORMAL /LPF
CHLORIDE SERPL-SCNC: 97 MMOL/L (ref 96–106)
CHOLEST SERPL-MCNC: 129 MG/DL (ref 100–199)
CO2 SERPL-SCNC: 20 MMOL/L (ref 20–29)
COLOR UR: YELLOW
CREAT SERPL-MCNC: 1.21 MG/DL (ref 0.76–1.27)
EOSINOPHIL # BLD AUTO: 0.1 X10E3/UL (ref 0–0.4)
EOSINOPHIL NFR BLD AUTO: 1 %
EPI CELLS #/AREA URNS HPF: NORMAL /HPF
ERYTHROCYTE [DISTWIDTH] IN BLOOD BY AUTOMATED COUNT: 13.2 % (ref 12.3–15.4)
GLOBULIN SER CALC-MCNC: 2.3 G/DL (ref 1.5–4.5)
GLUCOSE SERPL-MCNC: 100 MG/DL (ref 65–99)
GLUCOSE UR QL: NEGATIVE
HCT VFR BLD AUTO: 36 % (ref 37.5–51)
HDLC SERPL-MCNC: 67 MG/DL
HGB BLD-MCNC: 12.4 G/DL (ref 13–17.7)
HGB UR QL STRIP: NEGATIVE
IMM GRANULOCYTES # BLD: 0 X10E3/UL (ref 0–0.1)
IMM GRANULOCYTES NFR BLD: 0 %
INTERPRETATION, 910389: NORMAL
KETONES UR QL STRIP: NEGATIVE
LDLC SERPL CALC-MCNC: 50 MG/DL (ref 0–99)
LEUKOCYTE ESTERASE UR QL STRIP: NEGATIVE
LYMPHOCYTES # BLD AUTO: 1.4 X10E3/UL (ref 0.7–3.1)
LYMPHOCYTES NFR BLD AUTO: 20 %
MCH RBC QN AUTO: 31.9 PG (ref 26.6–33)
MCHC RBC AUTO-ENTMCNC: 34.4 G/DL (ref 31.5–35.7)
MCV RBC AUTO: 93 FL (ref 79–97)
MICRO URNS: ABNORMAL
MONOCYTES # BLD AUTO: 0.8 X10E3/UL (ref 0.1–0.9)
MONOCYTES NFR BLD AUTO: 11 %
MUCOUS THREADS URNS QL MICRO: PRESENT
NEUTROPHILS # BLD AUTO: 4.8 X10E3/UL (ref 1.4–7)
NEUTROPHILS NFR BLD AUTO: 68 %
NITRITE UR QL STRIP: NEGATIVE
PH UR STRIP: 6 [PH] (ref 5–7.5)
PLATELET # BLD AUTO: 249 X10E3/UL (ref 150–379)
POTASSIUM SERPL-SCNC: 5.3 MMOL/L (ref 3.5–5.2)
PROT SERPL-MCNC: 6.8 G/DL (ref 6–8.5)
PROT UR QL STRIP: ABNORMAL
PSA SERPL-MCNC: 0.4 NG/ML (ref 0–4)
RBC # BLD AUTO: 3.89 X10E6/UL (ref 4.14–5.8)
RBC #/AREA URNS HPF: NORMAL /HPF
SODIUM SERPL-SCNC: 131 MMOL/L (ref 134–144)
SP GR UR: 1.01 (ref 1–1.03)
TRIGL SERPL-MCNC: 62 MG/DL (ref 0–149)
TSH SERPL DL<=0.005 MIU/L-ACNC: 0.99 UIU/ML (ref 0.45–4.5)
UROBILINOGEN UR STRIP-MCNC: 0.2 MG/DL (ref 0.2–1)
VLDLC SERPL CALC-MCNC: 12 MG/DL (ref 5–40)
WBC # BLD AUTO: 7.1 X10E3/UL (ref 3.4–10.8)
WBC #/AREA URNS HPF: NORMAL /HPF

## 2018-12-18 RX ORDER — ERGOCALCIFEROL 1.25 MG/1
50000 CAPSULE ORAL
Qty: 24 CAP | Refills: 0 | Status: SHIPPED | OUTPATIENT
Start: 2018-12-20 | End: 2019-06-11 | Stop reason: ALTCHOICE

## 2018-12-18 RX ORDER — ERGOCALCIFEROL 1.25 MG/1
50000 CAPSULE ORAL
COMMUNITY
End: 2018-12-18 | Stop reason: SDUPTHER

## 2018-12-18 NOTE — PROGRESS NOTES
Spoke with patient after obtaining 2 patient identifiers  Patient made aware of results with no questions. Verbalized understanding. Patient stated he consumes 1 case of beer a week. He stated he consumes more on special occasions.

## 2018-12-18 NOTE — PROGRESS NOTES
Low Vit. D.  Begin ergocalciferol 50 K units twice weekly and recheck 3 mos. Stable proteinuria. Dec Na, inc K+ as before. How is alcohol intake?   Rest labs O.K.

## 2018-12-18 NOTE — TELEPHONE ENCOUNTER
PCP: Jonathan Palma MD    Last appt: 12/17/2018  Future Appointments   Date Time Provider Cornelio Fitzgerald   1/3/2019  2:20 PM Claudio Camara MD [facility]       Requested Prescriptions     Pending Prescriptions Disp Refills    ergocalciferol (ERGOCALCIFEROL) 50,000 unit capsule 8 Cap 2     Sig: Take 1 Cap by mouth two (2) days a week.  For 3 months       Prior labs and Blood pressures:  BP Readings from Last 3 Encounters:   06/07/18 174/88   10/09/17 158/81   10/26/16 148/89     Lab Results   Component Value Date/Time    Sodium 131 (L) 12/17/2018 09:18 AM    Potassium 5.3 (H) 12/17/2018 09:18 AM    Chloride 97 12/17/2018 09:18 AM    CO2 20 12/17/2018 09:18 AM    Anion gap 10 10/09/2009 08:00 AM    Glucose 100 (H) 12/17/2018 09:18 AM    BUN 17 12/17/2018 09:18 AM    Creatinine 1.21 12/17/2018 09:18 AM    BUN/Creatinine ratio 14 12/17/2018 09:18 AM    GFR est AA 75 12/17/2018 09:18 AM    GFR est non-AA 65 12/17/2018 09:18 AM    Calcium 9.3 12/17/2018 09:18 AM     Lab Results   Component Value Date/Time    Hemoglobin A1c 5.3 10/02/2017 08:46 AM     Lab Results   Component Value Date/Time    Cholesterol, total 129 12/17/2018 09:18 AM    HDL Cholesterol 67 12/17/2018 09:18 AM    LDL, calculated 50 12/17/2018 09:18 AM    VLDL, calculated 12 12/17/2018 09:18 AM    Triglyceride 62 12/17/2018 09:18 AM    CHOL/HDL Ratio 3.2 10/09/2009 08:00 AM     Lab Results   Component Value Date/Time    VITAMIN D, 25-HYDROXY 17.8 (L) 12/17/2018 09:18 AM       Lab Results   Component Value Date/Time    TSH 0.987 12/17/2018 09:18 AM

## 2019-01-03 ENCOUNTER — OFFICE VISIT (OUTPATIENT)
Dept: FAMILY MEDICINE CLINIC | Age: 59
End: 2019-01-03

## 2019-01-03 VITALS
OXYGEN SATURATION: 100 % | SYSTOLIC BLOOD PRESSURE: 136 MMHG | TEMPERATURE: 97.9 F | WEIGHT: 135.8 LBS | HEIGHT: 63 IN | DIASTOLIC BLOOD PRESSURE: 70 MMHG | HEART RATE: 96 BPM | RESPIRATION RATE: 15 BRPM | BODY MASS INDEX: 24.06 KG/M2

## 2019-01-03 DIAGNOSIS — F17.210 CIGARETTE SMOKER: ICD-10-CM

## 2019-01-03 DIAGNOSIS — Z00.00 PHYSICAL EXAM: Primary | ICD-10-CM

## 2019-01-03 DIAGNOSIS — I10 ESSENTIAL HYPERTENSION WITH GOAL BLOOD PRESSURE LESS THAN 140/90: ICD-10-CM

## 2019-01-03 DIAGNOSIS — R09.89 LEFT CAROTID BRUIT: ICD-10-CM

## 2019-01-03 DIAGNOSIS — N52.9 ERECTILE DYSFUNCTION, UNSPECIFIED ERECTILE DYSFUNCTION TYPE: ICD-10-CM

## 2019-01-03 RX ORDER — METOPROLOL SUCCINATE 50 MG/1
TABLET, EXTENDED RELEASE ORAL
Qty: 90 TAB | Refills: 3 | Status: SHIPPED | OUTPATIENT
Start: 2019-01-03 | End: 2020-01-01

## 2019-01-03 RX ORDER — AMLODIPINE AND BENAZEPRIL HYDROCHLORIDE 5; 40 MG/1; MG/1
CAPSULE ORAL
Qty: 90 CAP | Refills: 3 | Status: SHIPPED | OUTPATIENT
Start: 2019-01-03 | End: 2020-01-08

## 2019-01-03 NOTE — PROGRESS NOTES
HISTORY OF PRESENT ILLNESS Manuel Gracia is a 59 y.o. male. HPI Here for [facility]. Eye doctor past yr. Dentist prn. Full upper, partial lower. Pt declines flu, pneumonia shots. New job in [place]. Less stress. Plans to quit smoking in Feb.  No signif hx past yr. Went to  for bronchitis. Patient Active Problem List  
Diagnosis Code  Essential hypertension I10  
 ED (erectile dysfunction) N52.9  Cigarette smoker F17.210  Vitamin D deficiency E55.9  Anemia D64.9  Elevated blood pressure reading in office with diagnosis of hypertension I10 Current Outpatient Medications Medication Sig Dispense Refill  ergocalciferol (ERGOCALCIFEROL) 50,000 unit capsule Take 1 Cap by mouth two (2) days a week. For 3 months 24 Cap 0  
 metoprolol succinate (TOPROL-XL) 50 mg XL tablet TAKE 1 TABLET BY MOUTH DAILY 30 Tab 0  
 amLODIPine-benazepril (LOTREL) 5-40 mg per capsule TAKE 1 CAP BY MOUTH DAILY. 30 Cap 3  
 sildenafil citrate (VIAGRA) 100 mg tablet Take 0.5-1 Tabs by mouth as needed. 10 Tab 0  ibuprofen (ADVIL) 200 mg tablet Take 400 mg by mouth as needed for Pain.  ASPIRIN CHILDRENS PO Take  by mouth. Allergies Allergen Reactions  Hydrodiuril Other (comments) Decreased sodium level Past Medical History:  
Diagnosis Date  Advance directive discussed with patient 05/25/2016  Bilateral cataracts  Bronchitis 7/2007  
 multiple bouts of sinobronchitits  Diverticula of colon   
 dr Dawna Eric  ED (erectile dysfunction)  former cigarette smoker  Headache(784.0)  Hemorrhoids 11/2011  
 found on colonoscopy  Hypertension  Nasal polyp  Other ill-defined conditions(799.89) Dr Zia Shi to see for low urine calcium level  Tinea cruris  Vitamin D deficiency 1/2015 Past Surgical History:  
Procedure Laterality Date  COLONOSCOPY  11/3/11  
 repeat in 10 years Dr Amee Cortes  ENDOSCOPY, COLON, DIAGNOSTIC  11/3/11 10 yr repeat  HX HEENT  1988  
 sinus 1988 dr Nubia Hendrix  HX HEENT  2008  
 bilateral cataracts surgery [address]  
 vasectomy Family History Problem Relation Age of Onset  Seizures Father  Stroke Father  Heart Disease Father  Hypertension Sister Social History Tobacco Use  Smoking status: Current Every Day Smoker Packs/day: 1.00 Types: Cigarettes  Smokeless tobacco: Never Used  Tobacco comment: may have to quit due to the cost  
Substance Use Topics  Alcohol use: Yes Alcohol/week: 12.0 oz Types: 24 Cans of beer per week Lab Results Component Value Date/Time WBC 7.1 12/17/2018 09:18 AM  
 HGB 12.4 (L) 12/17/2018 09:18 AM  
 HCT 36.0 (L) 12/17/2018 09:18 AM  
 PLATELET 188 93/74/4860 09:18 AM  
 MCV 93 12/17/2018 09:18 AM  
 
Lab Results Component Value Date/Time Hemoglobin A1c 5.3 10/02/2017 08:46 AM  
 Hemoglobin A1c 5.8 (H) 06/08/2016 09:33 AM  
 Hemoglobin A1c 5.9 (H) 05/18/2016 09:35 AM  
 Glucose 100 (H) 12/17/2018 09:18 AM  
 LDL, calculated 50 12/17/2018 09:18 AM  
 Creatinine 1.21 12/17/2018 09:18 AM  
  
Lab Results Component Value Date/Time Cholesterol, total 129 12/17/2018 09:18 AM  
 HDL Cholesterol 67 12/17/2018 09:18 AM  
 LDL, calculated 50 12/17/2018 09:18 AM  
 Triglyceride 62 12/17/2018 09:18 AM  
 CHOL/HDL Ratio 3.2 10/09/2009 08:00 AM  
 
Lab Results Component Value Date/Time ALT (SGPT) 23 12/17/2018 09:18 AM  
 AST (SGOT) 26 12/17/2018 09:18 AM  
 Alk. phosphatase 78 12/17/2018 09:18 AM  
 Bilirubin, total 0.4 12/17/2018 09:18 AM  
 Albumin 4.5 12/17/2018 09:18 AM  
 Protein, total 6.8 12/17/2018 09:18 AM  
 PLATELET 271 10/93/3772 09:18 AM  
 
Lab Results Component Value Date/Time  GFR est non-AA 65 12/17/2018 09:18 AM  
 GFR est AA 75 12/17/2018 09:18 AM  
 Creatinine 1.21 12/17/2018 09:18 AM  
 BUN 17 12/17/2018 09:18 AM  
 Sodium 131 (L) 12/17/2018 09:18 AM  
 Potassium 5.3 (H) 12/17/2018 09:18 AM  
 Chloride 97 12/17/2018 09:18 AM  
 CO2 20 12/17/2018 09:18 AM  
 
Lab Results Component Value Date/Time TSH 0.987 12/17/2018 09:18 AM  
  
Lab Results Component Value Date/Time Glucose 100 (H) 12/17/2018 09:18 AM  
   
Lab Results Component Value Date/Time  
 Prostate Specific Ag 0.4 12/17/2018 09:18 AM  
 Prostate Specific Ag 0.4 10/02/2017 08:46 AM  
 Prostate Specific Ag 0.4 05/18/2016 09:35 AM  
 Prostate Specific Ag 0.4 10/09/2009 08:00 AM  
 
 
Review of Systems Constitutional: Negative. HENT: Negative. Eyes: Negative. Respiratory: Negative. Cardiovascular: Negative. Gastrointestinal: Negative. Genitourinary: Negative. Musculoskeletal: Positive for joint pain. Skin: Negative. Neurological: Negative. Endo/Heme/Allergies: Negative. Psychiatric/Behavioral: Negative. Physical Exam  
Vitals:  
 01/03/19 1427 01/03/19 1432 BP: 151/76 136/70 Pulse: 96   
Resp: 15 Temp: 97.9 °F (36.6 °C) TempSrc: Temporal   
SpO2: 100% Weight: 135 lb 12.8 oz (61.6 kg) Height: 5' 3.19\" (1.605 m) General  alert, cooperative, no distress, appears stated age Head  Normocephalic, without obvious abnormality, atraumatic Eyes  conjunctivae/corneas clear. PERRL. Fundi pale. Ears  Canals and TMs clear Nose Passages patent. Mucosa normal. No drainage or sinus tenderness. Throat Lips, mucosa, and tongue normal. Teeth and gums normal.  Post pharynx erythem. Neck supple, nontender, no adenopathy, thyroid: not enlarged, no masses/nodules, left carotid bruit. Back   symmetric, no curvature. FROM. No CVA tenderness Lungs   clear to auscultation bilaterally Chest wall  no tenderness Heart  regular rate and rhythm, no murmur, click, rub or gallop Abdomen   soft, non-tender. Bowel sounds normal. No masses,  No organomegaly Genitalia  Pt declined Rectal  Pt declined Extremities extremities normal, atraumatic, no cyanosis or edema Pulses  left radial<right, absent pedals Skin No rashes or lesions Neurologic Romberg neg, nml heel, toe. Pbs Tandem gait. DTRs 2-3+ symmetric ASSESSMENT and PLAN 
  ICD-10-CM ICD-9-CM 1. Physical exam Z00.00 V70.9 2. Erectile dysfunction, unspecified erectile dysfunction type N52.9 607.84 REFERRAL TO UROLOGY 3. Cigarette smoker F17.210 305.1 4. Essential hypertension with goal blood pressure less than 140/90 I10 401.9 metoprolol succinate (TOPROL-XL) 50 mg XL tablet  
   amLODIPine-benazepril (LOTREL) 5-40 mg per capsule 5. Left carotid bruit R09.89 785.9 DUPLEX CAROTID BILATERAL Follow-up Disposition: 
Return in about 1 year (around 1/3/2020) for [facility], labs.

## 2019-01-03 NOTE — PROGRESS NOTES
63 Annie Allan. Identified pt with two pt identifiers(name and ). Chief Complaint Patient presents with  Complete Physical  
 Results 1. Have you been to the ER, urgent care clinic since your last visit? Hospitalized since your last visit? [facility] 
 
2. Have you seen or consulted any other health care providers outside of the [facility] since your last visit? Include any pap smears or colon screening. No 
 
In the event something were to happen to you and you were unable to speak on your behalf, do you have an Advance Directive/ Living Will in place stating your wishes? NO If yes, do we have a copy on file NO If no, would you like information:     
 
 
 
Would you like to sign up for [employer] [employer] today, if you have not already done so? No 
If not, would you like information on MyChart, and how to sign up at a later time? No 
 
 
Medication reconciliation up to date and corrected with patient at this time. Today's provider has been notified of reason for visit, vitals and flowsheets obtained on patients. Reviewed record in preparation for visit, huddled with provider and have obtained necessary documentation. There are no preventive care reminders to display for this patient. Wt Readings from Last 3 Encounters:  
19 135 lb 12.8 oz (61.6 kg) 18 137 lb 14.4 oz (62.6 kg) 10/09/17 138 lb 1.6 oz (62.6 kg) Temp Readings from Last 3 Encounters:  
18 98.4 °F (36.9 °C) (Oral) 10/09/17 98.5 °F (36.9 °C) (Oral) 10/26/16 98.2 °F (36.8 °C) (Oral) BP Readings from Last 3 Encounters:  
18 174/88  
10/09/17 158/81  
10/26/16 148/89 Pulse Readings from Last 3 Encounters:  
18 90  
10/09/17 84  
10/26/16 88 Vitals:  
 19 1427 19 1432 BP: 151/76 136/70 Pulse: 96   
Resp: 15 Temp: 97.9 °F (36.6 °C) TempSrc: Temporal   
SpO2: 100% Weight: 135 lb 12.8 oz (61.6 kg) Height: 5' 3.19\" (1.605 m) PainSc:   0 - No pain Learning Assessment: 
:  
 
Learning Assessment 12/29/2014 PRIMARY LEARNER Patient HIGHEST LEVEL OF EDUCATION - PRIMARY LEARNER  GRADUATED HIGH SCHOOL OR GED  
BARRIERS PRIMARY LEARNER NONE PRIMARY LANGUAGE ENGLISH  
LEARNER PREFERENCE PRIMARY READING  
ANSWERED BY patient RELATIONSHIP SELF Depression Screening: 
:  
 
PHQ over the last two weeks 1/3/2019 Little interest or pleasure in doing things Not at all Feeling down, depressed, irritable, or hopeless Not at all Total Score PHQ 2 0 Fall Risk Assessment: 
:  
 
Fall Risk Assessment, last 12 mths 1/3/2019 Able to walk? Yes Fall in past 12 months? No  
 
 
Abuse Screening: 
:  
 
Abuse Screening Questionnaire 1/3/2019 6/7/2018 Do you ever feel afraid of your partner? [place] Sans Are you in a relationship with someone who physically or mentally threatens you? Sneha Sans Is it safe for you to go home? Y Y  
 
 
ADL Screening: 
:  
 

## 2019-06-11 ENCOUNTER — OFFICE VISIT (OUTPATIENT)
Dept: FAMILY MEDICINE CLINIC | Age: 59
End: 2019-06-11

## 2019-06-11 VITALS
HEART RATE: 82 BPM | OXYGEN SATURATION: 100 % | HEIGHT: 64 IN | WEIGHT: 138.7 LBS | SYSTOLIC BLOOD PRESSURE: 138 MMHG | DIASTOLIC BLOOD PRESSURE: 70 MMHG | BODY MASS INDEX: 23.68 KG/M2 | TEMPERATURE: 97.6 F | RESPIRATION RATE: 22 BRPM

## 2019-06-11 DIAGNOSIS — M79.605 LEFT LEG PAIN: Primary | ICD-10-CM

## 2019-06-11 NOTE — PROGRESS NOTES
63 Annie Allan. Identified pt with two pt identifiers(name and ). Chief Complaint   Patient presents with    Leg Pain     left leg; started about last week    Numbness     left leg    Extremity Weakness     left leg       1. Have you been to the ER, urgent care clinic since your last visit? Hospitalized since your last visit? No    2. Have you seen or consulted any other health care providers outside of the [address] [place] [address] since your last visit? Include any pap smears or colon screening. No      Would you like to sign up for MyChart today, if you have not already done so? No  If not, would you like information on MyChart, and how to sign up at a later time? No      Medication reconciliation up to date and corrected with patient at this time. Today's provider has been notified of reason for visit, vitals and flowsheets obtained on patients. Reviewed record in preparation for visit, huddled with provider and have obtained necessary documentation. There are no preventive care reminders to display for this patient.     Wt Readings from Last 3 Encounters:   19 138 lb 11.2 oz (62.9 kg)   19 135 lb 12.8 oz (61.6 kg)   18 137 lb 14.4 oz (62.6 kg)     Temp Readings from Last 3 Encounters:   19 97.6 °F (36.4 °C) (Oral)   19 97.9 °F (36.6 °C) (Temporal)   18 98.4 °F (36.9 °C) (Oral)     BP Readings from Last 3 Encounters:   19 168/82   19 136/70   18 174/88     Pulse Readings from Last 3 Encounters:   19 82   19 96   18 90     Vitals:    19   BP: 168/82   Pulse: 82   Resp: 22   Temp: 97.6 °F (36.4 °C)   TempSrc: Oral   SpO2: 100%   Weight: 138 lb 11.2 oz (62.9 kg)   Height: 5' 3.58\" (1.615 m)   PainSc:   5   PainLoc: Leg         Learning Assessment:  :     Learning Assessment 2014   PRIMARY LEARNER Patient   HIGHEST LEVEL OF EDUCATION - PRIMARY LEARNER  GRADUATED HIGH SCHOOL OR GED   BARRIERS PRIMARY LEARNER NONE   PRIMARY LANGUAGE ENGLISH   LEARNER PREFERENCE PRIMARY READING   ANSWERED BY patient   RELATIONSHIP SELF       Depression Screening:  :     3 most recent PHQ Screens 1/3/2019   Little interest or pleasure in doing things Not at all   Feeling down, depressed, irritable, or hopeless Not at all   Total Score PHQ 2 0       Fall Risk Assessment:  :     Fall Risk Assessment, last 12 mths 1/3/2019   Able to walk? Yes   Fall in past 12 months? No       Abuse Screening:  :     Abuse Screening Questionnaire 1/3/2019 6/7/2018   Do you ever feel afraid of your partner? N N   Are you in a relationship with someone who physically or mentally threatens you? N N   Is it safe for you to go home?  Y Y       ADL Screening:  :     ADL Assessment 1/3/2019   Feeding yourself No Help Needed   Getting from bed to chair No Help Needed   Getting dressed No Help Needed   Bathing or showering No Help Needed   Walk across the room (includes cane/walker) No Help Needed   Using the telphone No Help Needed   Taking your medications No Help Needed   Preparing meals No Help Needed   Managing money (expenses/bills) No Help Needed   Moderately strenuous housework (laundry) No Help Needed   Shopping for personal items (toiletries/medicines) No Help Needed   Shopping for groceries No Help Needed   Driving No Help Needed   Climbing a flight of stairs No Help Needed   Getting to places beyond walking distances No Help Needed

## 2019-06-11 NOTE — PROGRESS NOTES
Has bursitis in right hip. Takes 3 ibuprofen daily. Left leg began 2 weeks ago. Awoke one morning and left leg with pain in hip down lat mid calf. Limping from the pain. First day got worse throughout so left work that morning. Stayed out all week. Rested at home. Less painful through the week. Went back to work following Monday. Now has good and bad days. On good days almost no sxs. On bad days limps all day. Sxs still from hip to calf. Sometimes foot bothers. Noted tingling and cold feeling after went back to work of the foot. Denies inc weakness of left leg compared to right but thinks both legs are weaker than used to be. Visit Vitals  /70 (BP 1 Location: Right arm, BP Patient Position: Sitting)   Pulse 82   Temp 97.6 °F (36.4 °C) (Oral)   Resp 22   Ht 5' 3.58\" (1.615 m)   Wt 138 lb 11.2 oz (62.9 kg)   SpO2 100%   BMI 24.12 kg/m²       Patient alert and cooperative. Able to cross the left leg over the right without hip or leg symptoms. Assessment:  1. Left leg pain, dysesthesias, no muscle tenderness. Plan:  1. Set up ortho for EMGs of the lower extremities, wonder if this is a pinched nerve causing symptoms. 2. Advised can take ibuprofen again with lunch to see if benefit. 3. Follow otherwise here prn.

## 2019-06-20 ENCOUNTER — TELEPHONE (OUTPATIENT)
Dept: FAMILY MEDICINE CLINIC | Age: 59
End: 2019-06-20

## 2019-06-20 NOTE — TELEPHONE ENCOUNTER
India Stanley called from Dr. Irma Guadalupe office (orthopedic) stating that she was unable to schedule the patient for EMG studies due to the office being booked up. Patient was offered to go to [facility] and declined due to the drive. Shital wanted to inform the pcp. Not sure if the provider would like to recommend patient to another office/practice.

## 2019-06-21 NOTE — TELEPHONE ENCOUNTER
called Dr. Jigar Govea office to speak with Milady Yates to see which other provider's in the office can perform the EMG testing. Milady Yates was unavailable at time of call. Arronr was transferred by [facility] to [facility]'s . Arronr left  requesting phone call back.

## 2020-01-01 DIAGNOSIS — I10 ESSENTIAL HYPERTENSION WITH GOAL BLOOD PRESSURE LESS THAN 140/90: ICD-10-CM

## 2020-01-01 RX ORDER — METOPROLOL SUCCINATE 50 MG/1
TABLET, EXTENDED RELEASE ORAL
Qty: 30 TAB | Refills: 0 | Status: SHIPPED | OUTPATIENT
Start: 2020-01-01 | End: 2020-01-30

## 2020-01-08 DIAGNOSIS — I10 ESSENTIAL HYPERTENSION WITH GOAL BLOOD PRESSURE LESS THAN 140/90: ICD-10-CM

## 2020-01-08 RX ORDER — AMLODIPINE AND BENAZEPRIL HYDROCHLORIDE 5; 40 MG/1; MG/1
CAPSULE ORAL
Qty: 90 CAP | Refills: 0 | Status: SHIPPED | OUTPATIENT
Start: 2020-01-08 | End: 2020-03-31

## 2020-03-31 DIAGNOSIS — I10 ESSENTIAL HYPERTENSION WITH GOAL BLOOD PRESSURE LESS THAN 140/90: ICD-10-CM

## 2020-03-31 RX ORDER — AMLODIPINE AND BENAZEPRIL HYDROCHLORIDE 5; 40 MG/1; MG/1
CAPSULE ORAL
Qty: 90 CAP | Refills: 0 | Status: SHIPPED | OUTPATIENT
Start: 2020-03-31 | End: 2020-06-29

## 2020-07-30 DIAGNOSIS — I10 ESSENTIAL HYPERTENSION WITH GOAL BLOOD PRESSURE LESS THAN 140/90: ICD-10-CM

## 2020-08-04 RX ORDER — AMLODIPINE AND BENAZEPRIL HYDROCHLORIDE 5; 40 MG/1; MG/1
CAPSULE ORAL
Qty: 30 CAP | Refills: 0 | Status: SHIPPED | OUTPATIENT
Start: 2020-08-04 | End: 2020-09-02

## 2020-10-05 ENCOUNTER — VIRTUAL VISIT (OUTPATIENT)
Dept: FAMILY MEDICINE CLINIC | Age: 60
End: 2020-10-05
Payer: COMMERCIAL

## 2020-10-05 DIAGNOSIS — E55.9 VITAMIN D DEFICIENCY: ICD-10-CM

## 2020-10-05 DIAGNOSIS — I10 ESSENTIAL HYPERTENSION WITH GOAL BLOOD PRESSURE LESS THAN 140/90: Primary | ICD-10-CM

## 2020-10-05 DIAGNOSIS — M79.605 LEFT LEG PAIN: ICD-10-CM

## 2020-10-05 PROCEDURE — 99213 OFFICE O/P EST LOW 20 MIN: CPT | Performed by: FAMILY MEDICINE

## 2020-10-05 RX ORDER — METOPROLOL SUCCINATE 50 MG/1
50 TABLET, EXTENDED RELEASE ORAL DAILY
Qty: 90 TAB | Refills: 0 | Status: SHIPPED | OUTPATIENT
Start: 2020-10-05 | End: 2021-01-01

## 2020-10-05 RX ORDER — ACETAMINOPHEN 500 MG
1 TABLET ORAL DAILY
Qty: 1 KIT | Refills: 0 | Status: SHIPPED | OUTPATIENT
Start: 2020-10-05

## 2020-10-05 RX ORDER — AMLODIPINE AND BENAZEPRIL HYDROCHLORIDE 5; 40 MG/1; MG/1
CAPSULE ORAL
Qty: 90 CAP | Refills: 0 | Status: SHIPPED | OUTPATIENT
Start: 2020-10-05 | End: 2020-11-06 | Stop reason: SDUPTHER

## 2020-10-05 NOTE — PROGRESS NOTES
VIRTUAL VISIT      Pursuant to the emergency declaration under the [address] and [facility], [place], [address] waiver authority and the [employer] Act, this Virtual Visit was conducted, with patient's consent, to reduce the patient's risk of exposure to COVID-19 and provide continuity of care for an established patient. Services were provided through a video synchronous discussion virtually to substitute for in-person appointment. Consent:  She and/or her healthcare decision maker is aware that this patient-initiated Telehealth encounter is a billable service, with coverage as determined by her insurance carrier. She is aware that she may receive a bill and has provided verbal consent to proceed: Yes    Ruben Joe. is a 62 y.o. male presents with Medication Refill and Blood Pressure Check      Agree with nurse note. Pt with hypertension and  Vit d deficiency. He takes metoprolol 50 mg daily and Lotrel 5-40 mg daily for BP; tolerating well. He has been out of Lotrel 5-40 mg x 1 week. Patient denies vision changes, headaches, dizziness, chest pain, SOB, or swelling. Pt last saw PCP, Dr. Macrina Sandoval on 6/11/2019 for L leg pain radiating down to the L calf. /70, HR 82 at that time. Advised to take ibuprofen and f/u prn. He notes improvement with ibuprofen 200 mg 3 pills daily. On lengthier days, he takes an additional dosage of ibuprofen 200 mg 2 pills later in the day. Stressors: Pt was laid off from work on 3/26/2020, but is happy to share that he saw his family this weekend for the first time since Thanksgiving in 11/2019. Written by Squire Spurling, scribe, as dictated by Dr. Yaya Joseph DO.    ROS    Review of Systems negative except as noted above in HPI.     ALLERGIES:    Allergies   Allergen Reactions    Hydrodiuril Other (comments)     Decreased sodium level       CURRENT MEDICATIONS: Outpatient Medications Marked as Taking for the 10/5/20 encounter (Virtual Visit) with Frida Lundy,    Medication Sig Dispense Refill    amLODIPine-benazepril (LOTREL) 5-40 mg per capsule TAKE 1 CAPSULE BY MOUTH EVERY DAY. VISIT AND LABS DUE BEFORE NEXT REFILL  Indications: high blood pressure 90 Cap 0    Blood Pressure Monitor kit 1 Device by Does Not Apply route daily. 1 Kit 0    metoprolol succinate (TOPROL-XL) 50 mg XL tablet Take 1 Tab by mouth daily. Indications: high blood pressure 90 Tab 0    ibuprofen (ADVIL) 200 mg tablet Take 400 mg by mouth as needed for Pain.          PAST MEDICAL HISTORY:    Past Medical History:   Diagnosis Date    Advance directive discussed with patient 05/25/2016    Bilateral cataracts     Bronchitis 7/2007    multiple bouts of sinobronchitits    Diverticula of colon     dr Carey [facility] ED (erectile dysfunction)     former cigarette smoker     Headache(784.0)     Hemorrhoids 11/2011    found on colonoscopy    Hypertension     Nasal polyp     Other ill-defined conditions(799.89)     Dr Gt Duong to see for low urine calcium level    Tinea cruris     Vitamin D deficiency     1/2015       PAST SURGICAL HISTORY:    Past Surgical History:   Procedure Laterality Date    COLONOSCOPY  11/3/11    repeat in 10 years Dr Roger Ignacio, COLON, DIAGNOSTIC  11/3/11    10 yr repeat    HX HEENT  1988    sinus 1988 dr Soy Logan  2008    bilateral cataracts surgery    HX UROLOGICAL  1988    vasectomy       FAMILY HISTORY:    Family History   Problem Relation Age of Onset    Seizures Father     Stroke Father     Heart Disease Father     Hypertension Sister        SOCIAL HISTORY:    Social History     Socioeconomic History    Marital status:      Spouse name: Not on file    Number of children: Not on file    Years of education: Not on file    Highest education level: Not on file   Tobacco Use    Smoking status: Current Every Day Smoker     Packs/day: 1.00     Types: Cigarettes    Smokeless tobacco: Never Used    Tobacco comment: may have to quit due to the cost   Substance and Sexual Activity    Alcohol use: Yes     Alcohol/week: 20.0 standard drinks     Types: 24 Cans of beer per week    Drug use: No    Sexual activity: Yes     Partners: Female     Birth control/protection: Surgical       IMMUNIZATIONS:    Immunization History   Administered Date(s) Administered    TD Vaccine 03/28/2003    Tdap 06/28/2013         PHYSICAL EXAMINATION (PER VISUAL INSPECTION AND INSTRUCTIONS GIVEN TO PATIENT TO PERFORM)    Due to this being a TeleHealth encounter (During [facility]- public health emergency), evaluation of the following organ systems was limited to:     Vital Signs  There were no vitals taken for this visit. Weight Metrics 6/11/2019 1/3/2019 6/7/2018 10/9/2017 10/26/2016 5/25/2016 3/19/2015   Weight 138 lb 11.2 oz 135 lb 12.8 oz 137 lb 14.4 oz 138 lb 1.6 oz 140 lb 0.4 oz 137 lb 0.8 oz 153 lb   BMI 24.12 kg/m2 23.91 kg/m2 24.04 kg/m2 24.08 kg/m2 24.29 kg/m2 23.78 kg/m2 26.12 kg/m2       General appearance - Well nourished. Well appearing. Well developed. No acute distress. Obese. Head - Normocephalic. Atraumatic. Eyes - Extraocular eye movements intact. Sclera anicteric. Mildly injected sclera. Ears - Hearing is grossly normal bilaterally. Nose - normal and patent. No discharge. Chest - No visualized signs of difficulty breathing or respiratory distress. Neurological - awake, alert and oriented to person, place, and time and event. Cranial nerves II through XII intact. Clear speech. Musculoskeletal - Intact x 4 extremities. No pain with movement. Psychological -   normal behavior, dress and thought processes. Good insight. Good eye contact. Normal affect. Appropriate mood. Normal speech.       DATA REVIEWED    Lab Results   Component Value Date/Time    WBC 7.1 12/17/2018 09:18 AM    HGB 12.4 (L) 12/17/2018 09:18 AM    HCT 36.0 (L) 12/17/2018 09:18 AM    PLATELET 100 60/29/7586 09:18 AM    MCV 93 12/17/2018 09:18 AM     Lab Results   Component Value Date/Time    Sodium 131 (L) 12/17/2018 09:18 AM    Potassium 5.3 (H) 12/17/2018 09:18 AM    Chloride 97 12/17/2018 09:18 AM    CO2 20 12/17/2018 09:18 AM    Anion gap 10 10/09/2009 08:00 AM    Glucose 100 (H) 12/17/2018 09:18 AM    BUN 17 12/17/2018 09:18 AM    Creatinine 1.21 12/17/2018 09:18 AM    BUN/Creatinine ratio 14 12/17/2018 09:18 AM    GFR est AA 75 12/17/2018 09:18 AM    GFR est non-AA 65 12/17/2018 09:18 AM    Calcium 9.3 12/17/2018 09:18 AM    Bilirubin, total 0.4 12/17/2018 09:18 AM    Alk. phosphatase 78 12/17/2018 09:18 AM    Protein, total 6.8 12/17/2018 09:18 AM    Albumin 4.5 12/17/2018 09:18 AM    Globulin 2.7 10/09/2009 08:00 AM    A-G Ratio 2.0 12/17/2018 09:18 AM    ALT (SGPT) 23 12/17/2018 09:18 AM     Lab Results   Component Value Date/Time    Cholesterol, total 129 12/17/2018 09:18 AM    HDL Cholesterol 67 12/17/2018 09:18 AM    LDL, calculated 50 12/17/2018 09:18 AM    VLDL, calculated 12 12/17/2018 09:18 AM    Triglyceride 62 12/17/2018 09:18 AM    CHOL/HDL Ratio 3.2 10/09/2009 08:00 AM     Lab Results   Component Value Date/Time    VITAMIN D, 25-HYDROXY 17.8 (L) 12/17/2018 09:18 AM       Lab Results   Component Value Date/Time    Hemoglobin A1c 5.3 10/02/2017 08:46 AM     Lab Results   Component Value Date/Time    TSH 0.987 12/17/2018 09:18 AM       ASSESSMENT and PLAN      ICD-10-CM ICD-9-CM    1. Essential hypertension with goal blood pressure less than 140/90  I10 401.9 amLODIPine-benazepril (LOTREL) 5-40 mg per capsule      Blood Pressure Monitor kit      metoprolol succinate (TOPROL-XL) 50 mg XL tablet   2. Vitamin D deficiency  E55.9 268.9    3. Left leg pain  M79.605 729.5        Discussed the patient's BMI with him. The BMI follow up plan is as follows: I have counseled this patient on diet and exercise regimens.   Decrease carbohydrates (white foods, sweet foods, sweet drinks and alcohol), increase green leafy vegetables and protein (lean meats and beans) with each meal.  Avoid fried foods. Eat 3-5 small meals daily. Do not skip meals. Increase water intake. Increase physical activity to 30 minutes daily for health benefit or 60 minutes daily to prevent weight regain, as tolerated. Get 7-8 hours uninterrupted sleep nightly. Chart reviewed and updated. Continue current medications and care. Recommend Tylenol Arthritis Strength 1-2 pills up to TID for pain. Prescriptions written and sent to pharmacy; medication side effects discussed. Metoprolol 50 mg, Lotrel 5-40 mg, BP monitoring kit  Will defer lab orders to PCP during the time of physical exam.  Most recent tests reviewed from Dr. Zach Payan. Referrals given; patient urged to keep appointments with specialists. Counseled patient on health concerns: blood pressure, L leg pain  Relevant handouts given and discussed with patient. Offered empathy, support, legitimation, prayers, partnership to patient. Praised patient for progress. Encouraged the pt to sign up for [employer] to be able to view results and send me any questions or concerns prior to the next visit where we will go over results in detail. Follow-up and Dispositions    · Return in about 2 months (around 12/5/2020) for yearly physical exam, labs, bp with dr dalton or [facility]. Patient was offered a choice/choices in the treatment plan today. Patient expresses understanding of the plan and agrees with recommendations. 13 mins spent face to face with patient and more than 50% of this time spent in counseling and coordinating care. Written by jarred Foreman, as dictated by Dr. Lisa Guzman DO. Documentation True and Accepted by Thony Vazquez.  Meri Guillen.

## 2020-10-05 NOTE — PROGRESS NOTES
Caryle Caroli. is a 62 y.o. male     Chief Complaint   Patient presents with    Medication Refill     1. Have you been to the ER, urgent care clinic since your last visit? Hospitalized since your last visit? No    2. Have you seen or consulted any other health care providers outside of the [address] [place] [address] since your last visit? Include any pap smears or colon screening. No     Patient had home bp monitor but believes that batteries are low because of the readings received. Last reading was 74/71.     Pain Scale: 0/10  Pain Location: Patient did not verbalize pain at this time, rated 0/10       Send link to mobile number listed

## 2020-10-05 NOTE — PATIENT INSTRUCTIONS
CHIEF COMPLAINT  Chief Complaint   Patient presents with   • Follow-Up     MS       HPI  Griselda Swanson is a 30 y.o. female who presents for treatment of her neurologic problems and an evaluation recent events.    ADEM (acute disseminated encephalomyelitis)  Pt had a infection last year during a baclofen pump revision and had to have the pump removed after being Septic. Pt and caregivers have noticed a worsening of her leg spasticity which is preventing her from moving as well. Pt has been taking PO baclofen but it has not been enough to help her return to her previous functionality.    REVIEW OF SYSTEMS  Pertinent Positives:muscle spasticity, dysarthria, constipation, urinary retention   All other systems are negative.     PAST MEDICAL HISTORY  Past Medical History:   Diagnosis Date   • ADEM (acute disseminated encephalomyelitis)    • Back pain    • Breath shortness     since 2014   • C. difficile colitis    • Coma (CMS-HCC) August 2009    1 month, lesions on brain,  unknown etiology   • Coma (CMS-HCC) 2008    Spontaneous -    • Demyelinating disorder (CMS-HCC)     demyelinating encephpomyeltis    • Encephalitis    • Heart burn    • Indigestion    • Lesion of brain     unknown cuase   • MEDICAL HOME    • Migraines    • MS (multiple sclerosis) (CMS-HCC)    • Other specified disorder of intestines     constipation   • Pain    • Psychiatric problem     depression and anxiety   • Renal disorder     kidney stones   • Urinary incontinence        SOCIAL HISTORY  Social History     Social History   • Marital status: Single     Spouse name: N/A   • Number of children: N/A   • Years of education: N/A     Occupational History   • Not on file.     Social History Main Topics   • Smoking status: Former Smoker     Packs/day: 1.00     Years: 5.00     Types: Cigarettes     Quit date: 1/1/2008   • Smokeless tobacco: Never Used   • Alcohol use No   • Drug use: No   • Sexual activity: Not Currently     Other Topics Concern   • Not  Elevated Blood Pressure: Care Instructions Your Care Instructions Blood pressure is a measure of how hard the blood pushes against the walls of your arteries. It's normal for blood pressure to go up and down throughout the day. But if it stays up over time, you have high blood pressure. Two numbers tell you your blood pressure. The first number is the systolic pressure. It shows how hard the blood pushes when your heart is pumping. The second number is the diastolic pressure. It shows how hard the blood pushes between heartbeats, when your heart is relaxed and filling with blood. An ideal blood pressure in adults is less than 120/80 (say \"120 over 80\"). High blood pressure is 140/90 or higher. You have high blood pressure if your top number is 140 or higher or your bottom number is 90 or higher, or both. The main test for high blood pressure is simple, fast, and painless. To diagnose high blood pressure, your doctor will test your blood pressure at different times. After testing your blood pressure, your doctor may ask you to test it again when you are home. If you are diagnosed with high blood pressure, you can work with your doctor to make a long-term plan to manage it. Follow-up care is a key part of your treatment and safety. Be sure to make and go to all appointments, and call your doctor if you are having problems. It's also a good idea to know your test results and keep a list of the medicines you take. How can you care for yourself at home? · Do not smoke. Smoking increases your risk for heart attack and stroke. If you need help quitting, talk to your doctor about stop-smoking programs and medicines. These can increase your chances of quitting for good. · Stay at a healthy weight. · Try to limit how much sodium you eat to less than 2,300 milligrams (mg) a day. Your doctor may ask you to try to eat less than 1,500 mg a day. on file     Social History Narrative   • No narrative on file       SURGICAL HISTORY  Past Surgical History:   Procedure Laterality Date   • CATH PLACE PERM EPIDURAL  3/6/2012    Performed by PARI ROBERSON at [facility]   • CATH PLACE PERM EPIDURAL  6/26/2012    Performed by PARI ROBERSON at [facility]   • CATH PLACE PERM EPIDURAL N/A 3/8/2016    Procedure: BACLOFEN PUMP TRIAL;  Surgeon: Pari Roberson M.D.;  Location: [facility];  Service:    • CATH PLACE PERM EPIDURAL Left 6/14/2016    Procedure: CATH PLACE PERM EPIDURAL - BACLOFEN PUMP AND CATHETER REPLACEMENT  - BACK AND ABDOMEN;  Surgeon: Pari Roberson M.D.;  Location: [facility];  Service:    • MAMMOPLASTY AUGMENTATION  2002   • [place] BACLOFEN INTRATHECAL TRIAL  3/8/2016    Dr. Roberson  [facility]   • PUMP INSERT/REMOVE  3/12/2013    Performed by Pari Roberson M.D. at [facility]   • PUMP INSERT/REMOVE Left 7/1/2016    Procedure: PUMP REMOVAL;  Surgeon: Pari Roberson M.D.;  Location: [facility];  Service:    • PUMP REVISION  10/8/2013    Performed by Pari Roberson M.D. at [facility]   • TONSILLECTOMY         CURRENT MEDICATIONS  Current Outpatient Prescriptions   Medication Sig Dispense Refill   • cyclobenzaprine (FLEXERIL) 10 MG Tab Take 1 Tab by mouth 3 times a day as needed for Muscle Spasms. 90 Tab 5   • simethicone (MYLICON) 125 MG chewable tablet Take 125 mg by mouth 4 times a day as needed. Indications: Gas     • Biotin w/ Vitamins C & E (HAIR SKIN & NAILS GUMMIES PO) Take 2 Each by mouth every day. Indications: supplement     • SUMAtriptan Succinate (IMITREX) 6 MG/0.5ML Solution Inject 2 mg as instructed as needed. inject 2 mg subcut every 24 hrs as needed for severe headache    Indications: Migraine Headache     • tramadol (ULTRAM) 50 MG Tab Take 1 Tab by mouth every four hours as needed for Moderate Pain. 60 Tab 4   • zolpidem (AMBIEN) 5 MG  · Be physically active. Get at least 30 minutes of exercise on most days of the week. Walking is a good choice. You also may want to do other activities, such as running, swimming, cycling, or playing tennis or team sports. · Avoid or limit alcohol. Talk to your doctor about whether you can drink any alcohol. · Eat plenty of fruits, vegetables, and low-fat dairy products. Eat less saturated and total fats. · Learn how to check your blood pressure at home. When should you call for help? Call your doctor now or seek immediate medical care if: 
? · Your blood pressure is much higher than normal (such as 180/110 or higher). ? · You think high blood pressure is causing symptoms such as: ¨ Severe headache. ¨ Blurry vision. ? Watch closely for changes in your health, and be sure to contact your doctor if: 
? · You do not get better as expected. Where can you learn more? Go to http://www.gray.com/. Enter Y004 in the search box to learn more about \"Elevated Blood Pressure: Care Instructions. \" Current as of: September 21, 2016 Content Version: 11.4 © 7247-4411 [employer]. Care instructions adapted under license by [employer] (which disclaims liability or warranty for this information). If you have questions about a medical condition or this instruction, always ask your healthcare professional. [address] any warranty or liability for your use of this information. Check BP twice weekly. Notify me if it consistently is above 140/90 or below 90/60. Bring your blood pressure log to your next office visit. Decrease salt, fats, caffeine, alcohol in your diet. Increase water, fiber. Exercise 5 times weekly for 30 minutes daily as tolerated. Continue current medications, care and subspecialty follow up. Visit eye doctor yearly to check your eyes blood pressure related changes. DASH Diet: Care Instructions Tab Take 1 Tab by mouth at bedtime as needed for Sleep. 30 Tab 3   • VOLTAREN 1 % Gel APPLY 4 GRAMS TO KNEE AND 3 GRAMS TO ANKLES UP TO FOUR TIMES DAILY AS NEEDED 100 g 11   • Diclofenac Sodium 1 % Gel APPLY 4 GRAMS TO KNEE AND 3 GRAMS TO ANKLES UP TO FOUR TIMES DAILY AS NEEDED 100 g 11   • sumatriptan (IMITREX) 100 MG tablet TAKE 1 TABLET BY MOUTH AT ONSET OF HEADACHE. MAY REPEAT IN 2 HOURS. MAX 2 TABLETS A DAY 9 Tab 11   • Misc. Devices Misc W/C stabilizer needs eval and possible replacement 1 Each 11   • clindamycin-benzoyl peroxide (BENZACLIN) gel APPLY TO THE FACE EVERY DAY 50 g 11   • tamsulosin (FLOMAX) 0.4 MG capsule Take 1 Cap by mouth every day. 90 Cap 11   • Misc. Devices Misc Please eval and fix electric W/C. Please eval also for W/C needs. 1 Each 11   • citalopram (CELEXA) 20 MG Tab TAKE 1 TABLET BY MOUTH EVERY DAY 90 Tab 3   • meloxicam (MOBIC) 15 MG tablet Take 1 Tab by mouth 1 time daily as needed. 90 Tab 3   • baclofen (LIORESAL) 10 MG Tab Take 1 Tab by mouth 3 times a day. 90 Tab 11   • Misc. Devices Misc Please allow patient to have support/therapy dog in appt. Regardless of weight due to multiple chronic illnesses and debility. 1 Each 11   • topiramate (TOPAMAX) 25 MG Tab Take 1 Tab by mouth 2 times a day. 60 Tab 11   • promethazine (PHENERGAN) 25 MG Tab TAKE 1 TABLET BY MOUTH EVERY 6 HOURS AS NEEDED FOR NAUSEA AND VOMITING 30 Tab 0   • SUMAtriptan Succinate 6 MG/0.5ML Solution Auto-injector INJECT 0.5 MLS IN THE MUSCLE ONCE FOR 1 DOSE 4 mL 0   • baclofen (LIORESAL) 20 MG tablet Take 2 Tabs by mouth every 6 hours. (Patient taking differently: Take 20 mg by mouth 3 times a day.) 90 Tab 3   • lactulose 10 GM/15ML Solution Take 15-30 mL by mouth every four hours as needed. Indications: Chronic Constipation     • Calcium Carbonate Antacid (TUMS PO) Take 1 tablet by mouth as needed. Indications: heartburn     • Multiple Vitamins-Minerals (MULTIVITAMIN GUMMIES ADULTS PO) Take 1 Tab by mouth every day.  Your Care Instructions The DASH diet is an eating plan that can help lower your blood pressure. DASH stands for Dietary Approaches to Stop Hypertension. Hypertension is high blood pressure. The DASH diet focuses on eating foods that are high in calcium, potassium, and magnesium. These nutrients can lower blood pressure. The foods that are highest in these nutrients are fruits, vegetables, low-fat dairy products, nuts, seeds, and legumes. But taking calcium, potassium, and magnesium supplements instead of eating foods that are high in those nutrients does not have the same effect. The DASH diet also includes whole grains, fish, and poultry. The DASH diet is one of several lifestyle changes your doctor may recommend to lower your high blood pressure. Your doctor may also want you to decrease the amount of sodium in your diet. Lowering sodium while following the DASH diet can lower blood pressure even further than just the DASH diet alone. Follow-up care is a key part of your treatment and safety. Be sure to make and go to all appointments, and call your doctor if you are having problems. It's also a good idea to know your test results and keep a list of the medicines you take. How can you care for yourself at home? Following the DASH diet · Eat 4 to 5 servings of fruit each day. A serving is 1 medium-sized piece of fruit, ½ cup chopped or canned fruit, 1/4 cup dried fruit, or 4 ounces (½ cup) of fruit juice. Choose fruit more often than fruit juice. · Eat 4 to 5 servings of vegetables each day. A serving is 1 cup of lettuce or raw leafy vegetables, ½ cup of chopped or cooked vegetables, or 4 ounces (½ cup) of vegetable juice. Choose vegetables more often than vegetable juice. · Get 2 to 3 servings of low-fat and fat-free dairy each day. A serving is 8 ounces of milk, 1 cup of yogurt, or 1 ½ ounces of cheese. · Eat 6 to 8 servings of grains each day.  A serving is 1 slice of bread, 1 "Indications: vitamin supplement.     • vitamin D (CHOLECALCIFEROL) 1000 UNIT Tab Take 1,000 Units by mouth 4 times a day. Indications: supplement       No current facility-administered medications for this visit.        ALLERGIES  Allergies   Allergen Reactions   • Bactrim      Reaction unknown   • Fentanyl      Makes her agressive       PHYSICAL EXAM  VITAL SIGNS: /70   Pulse 85   Temp 36.8 °C (98.3 °F)   Resp 16   Ht 1.702 m (5' 7\")   Wt 59 kg (130 lb)   SpO2 97%   BMI 20.36 kg/m²   Constitutional: Well developed, Well nourished, No acute distress, Non-toxic appearance.   HENT: normocephalic   Eyes: fundi disc sharp   Neck: Normal range of motion, No tenderness, Supple, No stridor.   Cardiovascular: Normal heart rate, Normal rhythm, No murmurs, No rubs, No gallops.   Thorax & Lungs: Normal breath sounds, No respiratory distress, No wheezing, No chest tenderness.   Abdomen: Bowel sounds normal, Soft, No tenderness, No masses, No pulsatile masses.   Skin: Warm, Dry, No erythema, No rash.   Back: No tenderness, No CVA tenderness.   Extremities: Intact distal pulses, No edema, No tenderness, No cyanosis, No clubbing.   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   Lab: Reviewed with patient in detail and as noted in results.    EEG  FIRDA    RADIOLOGY/PROCEDURES  MRI brain reviewed in the PACS      ASSESSMENT AND PLAN  1. ADEM    Sequale of spasticity and aphasia have gotten worse over the last year post infection. Pt tried botox injections with Dr. patiño and they failed to work per her report.    Refer to Dr Vega for evaluation of Baclofen Pump replacement.    Add cyclobenzaprine to her baclofen and titrate up as tolerated.       I spent 30 minutes with this patient, here caregiver and home PT, over fifty percent was spent " ounce of dry cereal, or ½ cup of cooked rice, pasta, or cooked cereal. Try to choose whole-grain products as much as possible. · Limit lean meat, poultry, and fish to 2 servings each day. A serving is 3 ounces, about the size of a deck of cards. · Eat 4 to 5 servings of nuts, seeds, and legumes (cooked dried beans, lentils, and split peas) each week. A serving is 1/3 cup of nuts, 2 tablespoons of seeds, or ½ cup of cooked beans or peas. · Limit fats and oils to 2 to 3 servings each day. A serving is 1 teaspoon of vegetable oil or 2 tablespoons of salad dressing. · Limit sweets and added sugars to 5 servings or less a week. A serving is 1 tablespoon jelly or jam, ½ cup sorbet, or 1 cup of lemonade. · Eat less than 2,300 milligrams (mg) of sodium a day. If you limit your sodium to 1,500 mg a day, you can lower your blood pressure even more. Tips for success · Start small. Do not try to make dramatic changes to your diet all at once. You might feel that you are missing out on your favorite foods and then be more likely to not follow the plan. Make small changes, and stick with them. Once those changes become habit, add a few more changes. · Try some of the following: ? Make it a goal to eat a fruit or vegetable at every meal and at snacks. This will make it easy to get the recommended amount of fruits and vegetables each day. ? Try yogurt topped with fruit and nuts for a snack or healthy dessert. ? Add lettuce, tomato, cucumber, and onion to sandwiches. ? Combine a ready-made pizza crust with low-fat mozzarella cheese and lots of vegetable toppings. Try using tomatoes, squash, spinach, broccoli, carrots, cauliflower, and onions. ? Have a variety of cut-up vegetables with a low-fat dip as an appetizer instead of chips and dip. ? Sprinkle sunflower seeds or chopped almonds over salads. Or try adding chopped walnuts or almonds to cooked vegetables. ? Try some vegetarian meals using beans and peas. Add garbanzo or kidney beans to salads. Make burritos and tacos with mashed ogden beans or black beans. Where can you learn more? Go to http://www.gray.com/ Enter F505 in the search box to learn more about \"DASH Diet: Care Instructions. \" Current as of: December 16, 2019               Content Version: 12.6 © 9911-3462 [employer]. Care instructions adapted under license by [employer] (which disclaims liability or warranty for this information). If you have questions about a medical condition or this instruction, always ask your healthcare professional. [address] any warranty or liability for your use of this information. DASH Diet: Care Instructions Your Care Instructions The DASH diet is an eating plan that can help lower your blood pressure. DASH stands for Dietary Approaches to Stop Hypertension. Hypertension is high blood pressure. The DASH diet focuses on eating foods that are high in calcium, potassium, and magnesium. These nutrients can lower blood pressure. The foods that are highest in these nutrients are fruits, vegetables, low-fat dairy products, nuts, seeds, and legumes. But taking calcium, potassium, and magnesium supplements instead of eating foods that are high in those nutrients does not have the same effect. The DASH diet also includes whole grains, fish, and poultry. The DASH diet is one of several lifestyle changes your doctor may recommend to lower your high blood pressure. Your doctor may also want you to decrease the amount of sodium in your diet. Lowering sodium while following the DASH diet can lower blood pressure even further than just the DASH diet alone. Follow-up care is a key part of your treatment and safety.  Be sure to make and go to all appointments, and call your doctor if you are having counseling patient on their condition, best management practices, reviewing test results and risks and benefits of treatment.         problems. It's also a good idea to know your test results and keep a list of the medicines you take. How can you care for yourself at home? Following the DASH diet · Eat 4 to 5 servings of fruit each day. A serving is 1 medium-sized piece of fruit, ½ cup chopped or canned fruit, 1/4 cup dried fruit, or 4 ounces (½ cup) of fruit juice. Choose fruit more often than fruit juice. · Eat 4 to 5 servings of vegetables each day. A serving is 1 cup of lettuce or raw leafy vegetables, ½ cup of chopped or cooked vegetables, or 4 ounces (½ cup) of vegetable juice. Choose vegetables more often than vegetable juice. · Get 2 to 3 servings of low-fat and fat-free dairy each day. A serving is 8 ounces of milk, 1 cup of yogurt, or 1 ½ ounces of cheese. · Eat 6 to 8 servings of grains each day. A serving is 1 slice of bread, 1 ounce of dry cereal, or ½ cup of cooked rice, pasta, or cooked cereal. Try to choose whole-grain products as much as possible. · Limit lean meat, poultry, and fish to 2 servings each day. A serving is 3 ounces, about the size of a deck of cards. · Eat 4 to 5 servings of nuts, seeds, and legumes (cooked dried beans, lentils, and split peas) each week. A serving is 1/3 cup of nuts, 2 tablespoons of seeds, or ½ cup of cooked beans or peas. · Limit fats and oils to 2 to 3 servings each day. A serving is 1 teaspoon of vegetable oil or 2 tablespoons of salad dressing. · Limit sweets and added sugars to 5 servings or less a week. A serving is 1 tablespoon jelly or jam, ½ cup sorbet, or 1 cup of lemonade. · Eat less than 2,300 milligrams (mg) of sodium a day. If you limit your sodium to 1,500 mg a day, you can lower your blood pressure even more. Tips for success · Start small. Do not try to make dramatic changes to your diet all at once. You might feel that you are missing out on your favorite foods and then be more likely to not follow the plan.  Make small changes, and stick with them. Once those changes become habit, add a few more changes. · Try some of the following: ? Make it a goal to eat a fruit or vegetable at every meal and at snacks. This will make it easy to get the recommended amount of fruits and vegetables each day. ? Try yogurt topped with fruit and nuts for a snack or healthy dessert. ? Add lettuce, tomato, cucumber, and onion to sandwiches. ? Combine a ready-made pizza crust with low-fat mozzarella cheese and lots of vegetable toppings. Try using tomatoes, squash, spinach, broccoli, carrots, cauliflower, and onions. ? Have a variety of cut-up vegetables with a low-fat dip as an appetizer instead of chips and dip. ? Sprinkle sunflower seeds or chopped almonds over salads. Or try adding chopped walnuts or almonds to cooked vegetables. ? Try some vegetarian meals using beans and peas. Add garbanzo or kidney beans to salads. Make burritos and tacos with mashed ogden beans or black beans. Where can you learn more? Go to http://www.gray.com/ Enter S357 in the search box to learn more about \"DASH Diet: Care Instructions. \" Current as of: December 16, 2019               Content Version: 12.6 © 0359-3613 [employer]. Care instructions adapted under license by [employer] (which disclaims liability or warranty for this information). If you have questions about a medical condition or this instruction, always ask your healthcare professional. [place] [address] any warranty or liability for your use of this information.

## 2020-11-06 ENCOUNTER — OFFICE VISIT (OUTPATIENT)
Dept: FAMILY MEDICINE CLINIC | Age: 60
End: 2020-11-06
Payer: COMMERCIAL

## 2020-11-06 VITALS
SYSTOLIC BLOOD PRESSURE: 173 MMHG | WEIGHT: 153.4 LBS | BODY MASS INDEX: 30.12 KG/M2 | OXYGEN SATURATION: 98 % | HEART RATE: 73 BPM | RESPIRATION RATE: 17 BRPM | HEIGHT: 60 IN | TEMPERATURE: 98.2 F | DIASTOLIC BLOOD PRESSURE: 83 MMHG

## 2020-11-06 DIAGNOSIS — E55.9 VITAMIN D DEFICIENCY: ICD-10-CM

## 2020-11-06 DIAGNOSIS — I10 ESSENTIAL HYPERTENSION: ICD-10-CM

## 2020-11-06 DIAGNOSIS — F17.210 CIGARETTE SMOKER: ICD-10-CM

## 2020-11-06 DIAGNOSIS — Z00.00 LABORATORY EXAM ORDERED AS PART OF ROUTINE GENERAL MEDICAL EXAMINATION: ICD-10-CM

## 2020-11-06 DIAGNOSIS — Z00.00 WELL ADULT HEALTH CHECK: ICD-10-CM

## 2020-11-06 DIAGNOSIS — R80.9 PROTEINURIA, UNSPECIFIED TYPE: ICD-10-CM

## 2020-11-06 DIAGNOSIS — Z23 ENCOUNTER FOR IMMUNIZATION: Primary | ICD-10-CM

## 2020-11-06 DIAGNOSIS — E87.1 HYPONATREMIA: ICD-10-CM

## 2020-11-06 DIAGNOSIS — I10 ESSENTIAL HYPERTENSION WITH GOAL BLOOD PRESSURE LESS THAN 140/90: ICD-10-CM

## 2020-11-06 LAB
25(OH)D3 SERPL-MCNC: 17.5 NG/ML (ref 30–100)
ALBUMIN SERPL-MCNC: 4.2 G/DL (ref 3.5–5)
ALBUMIN/GLOB SERPL: 1.4 {RATIO} (ref 1.1–2.2)
ALP SERPL-CCNC: 105 U/L (ref 45–117)
ALT SERPL-CCNC: 64 U/L (ref 12–78)
ANION GAP SERPL CALC-SCNC: 8 MMOL/L (ref 5–15)
APPEARANCE UR: CLEAR
AST SERPL-CCNC: 42 U/L (ref 15–37)
BACTERIA URNS QL MICRO: NEGATIVE /HPF
BASOPHILS # BLD: 0 K/UL (ref 0–0.1)
BASOPHILS NFR BLD: 1 % (ref 0–1)
BILIRUB SERPL-MCNC: 0.6 MG/DL (ref 0.2–1)
BILIRUB UR QL: NEGATIVE
BUN SERPL-MCNC: 15 MG/DL (ref 6–20)
BUN/CREAT SERPL: 11 (ref 12–20)
CALCIUM SERPL-MCNC: 9 MG/DL (ref 8.5–10.1)
CHLORIDE SERPL-SCNC: 95 MMOL/L (ref 97–108)
CHOLEST SERPL-MCNC: 130 MG/DL
CO2 SERPL-SCNC: 22 MMOL/L (ref 21–32)
COLOR UR: ABNORMAL
CREAT SERPL-MCNC: 1.4 MG/DL (ref 0.7–1.3)
CREAT UR-MCNC: 25.7 MG/DL
DIFFERENTIAL METHOD BLD: ABNORMAL
EOSINOPHIL # BLD: 0.3 K/UL (ref 0–0.4)
EOSINOPHIL NFR BLD: 6 % (ref 0–7)
EPITH CASTS URNS QL MICRO: ABNORMAL /LPF
ERYTHROCYTE [DISTWIDTH] IN BLOOD BY AUTOMATED COUNT: 12.9 % (ref 11.5–14.5)
EST. AVERAGE GLUCOSE BLD GHB EST-MCNC: 105 MG/DL
GLOBULIN SER CALC-MCNC: 3.1 G/DL (ref 2–4)
GLUCOSE SERPL-MCNC: 87 MG/DL (ref 65–100)
GLUCOSE UR STRIP.AUTO-MCNC: NEGATIVE MG/DL
HBA1C MFR BLD: 5.3 % (ref 4–5.6)
HCT VFR BLD AUTO: 38.6 % (ref 36.6–50.3)
HDLC SERPL-MCNC: 66 MG/DL
HDLC SERPL: 2 {RATIO} (ref 0–5)
HGB BLD-MCNC: 13.1 G/DL (ref 12.1–17)
HGB UR QL STRIP: NEGATIVE
HYALINE CASTS URNS QL MICRO: ABNORMAL /LPF (ref 0–5)
IMM GRANULOCYTES # BLD AUTO: 0 K/UL (ref 0–0.04)
IMM GRANULOCYTES NFR BLD AUTO: 1 % (ref 0–0.5)
KETONES UR QL STRIP.AUTO: NEGATIVE MG/DL
LDLC SERPL CALC-MCNC: 51 MG/DL (ref 0–100)
LEUKOCYTE ESTERASE UR QL STRIP.AUTO: NEGATIVE
LIPID PROFILE,FLP: NORMAL
LYMPHOCYTES # BLD: 1.4 K/UL (ref 0.8–3.5)
LYMPHOCYTES NFR BLD: 23 % (ref 12–49)
MCH RBC QN AUTO: 31.7 PG (ref 26–34)
MCHC RBC AUTO-ENTMCNC: 33.9 G/DL (ref 30–36.5)
MCV RBC AUTO: 93.5 FL (ref 80–99)
MICROALBUMIN UR-MCNC: 33 MG/DL
MICROALBUMIN/CREAT UR-RTO: 1284 MG/G (ref 0–30)
MONOCYTES # BLD: 0.8 K/UL (ref 0–1)
MONOCYTES NFR BLD: 13 % (ref 5–13)
NEUTS SEG # BLD: 3.5 K/UL (ref 1.8–8)
NEUTS SEG NFR BLD: 56 % (ref 32–75)
NITRITE UR QL STRIP.AUTO: NEGATIVE
NRBC # BLD: 0 K/UL (ref 0–0.01)
NRBC BLD-RTO: 0 PER 100 WBC
PH UR STRIP: 5.5 [PH] (ref 5–8)
PLATELET # BLD AUTO: 235 K/UL (ref 150–400)
PMV BLD AUTO: 9.5 FL (ref 8.9–12.9)
POTASSIUM SERPL-SCNC: 4.9 MMOL/L (ref 3.5–5.1)
PROT SERPL-MCNC: 7.3 G/DL (ref 6.4–8.2)
PROT UR STRIP-MCNC: 30 MG/DL
PSA SERPL-MCNC: 0.4 NG/ML (ref 0.01–4)
RBC # BLD AUTO: 4.13 M/UL (ref 4.1–5.7)
RBC #/AREA URNS HPF: ABNORMAL /HPF (ref 0–5)
SODIUM SERPL-SCNC: 125 MMOL/L (ref 136–145)
SP GR UR REFRACTOMETRY: 1.01 (ref 1–1.03)
TRIGL SERPL-MCNC: 65 MG/DL (ref ?–150)
TSH SERPL DL<=0.05 MIU/L-ACNC: 0.99 UIU/ML (ref 0.36–3.74)
UA: UC IF INDICATED,UAUC: ABNORMAL
UROBILINOGEN UR QL STRIP.AUTO: 0.2 EU/DL (ref 0.2–1)
VLDLC SERPL CALC-MCNC: 13 MG/DL
WBC # BLD AUTO: 6.1 K/UL (ref 4.1–11.1)
WBC URNS QL MICRO: ABNORMAL /HPF (ref 0–4)

## 2020-11-06 PROCEDURE — 90732 PPSV23 VACC 2 YRS+ SUBQ/IM: CPT | Performed by: NURSE PRACTITIONER

## 2020-11-06 PROCEDURE — 99396 PREV VISIT EST AGE 40-64: CPT | Performed by: NURSE PRACTITIONER

## 2020-11-06 PROCEDURE — 90471 IMMUNIZATION ADMIN: CPT | Performed by: NURSE PRACTITIONER

## 2020-11-06 RX ORDER — AMLODIPINE AND BENAZEPRIL HYDROCHLORIDE 5; 40 MG/1; MG/1
CAPSULE ORAL
Qty: 90 CAP | Refills: 0 | Status: SHIPPED | OUTPATIENT
Start: 2020-11-06 | End: 2021-03-29 | Stop reason: SDUPTHER

## 2020-11-06 NOTE — PROGRESS NOTES
Skip Conroy is a 56 y.o. male    HIPAA verified by two patient identifiers. Chief Complaint   Patient presents with    Complete Physical    Labs     Pt is fasting      1. Have you been to the ER, urgent care clinic since your last visit? Hospitalized since your last visit? No    2. Have you seen or consulted any other health care providers outside of the [address] [place] [address] since your last visit? Include any pap smears or colon screening.  No    Visit Vitals  BP (!) 173/83 (BP 1 Location: Right arm, BP Patient Position: Sitting)   Pulse 73   Temp 98.2 °F (36.8 °C) (Temporal)   Resp 17   Ht 5' (1.524 m)   Wt 153 lb 6.4 oz (69.6 kg)   SpO2 98%   BMI 29.96 kg/m²       Pain Scale: 0 - No pain/10  Pain Location:

## 2020-11-06 NOTE — PATIENT INSTRUCTIONS
1) Healthy Weight Body Mass Index is a noninvasive way to screen for weight and body fat. This is a mathematical calculation based on your height and weight. A healthy BMI is between 20% -24.9%. Your BMI is 30 %. There is a relationship between high BMI and various healthy problems, such as osteoarthritis, muscle pain, increased risk of cancer, diabetes, heart disease, stroke, hypertension, high cholesterol, sleep apnea, breathing problems, depression, which is why a healthy BMI is so important. In terms of weight loss, a 5-10% reduction in body weight over 3-6 months is a reasonable goal.  There would likely be improvements in risk for disease such as diabetes and heart disease, with this amount of weight loss. In order to lose weight, try reducing your daily intake of calories by decreasing portion size of food and increase exercise to help reduce weight. Eat 3-5 small meals per day instead of 3 large meals. A good tip to losing weight : DON'T EAT OR DRINK ANYTHING AFTER DINNER! Choose lean meats for protein source which include chicken, pork, and turkey. The recommended serving size for protein is a 2-3 oz serving (the size of your fist), and 1-1.5 oz of carbohydrate per meal (about 1 cup). Increase servings of fruits and vegetables. Limit processed carbohydrates, (i.e. most breads, crackers, pasta, chips, rice, breaded or battered food, etc). If you choose to eat carbohydrates, whole wheat, (instead of white) is a healthier option for bread, rice, and crackers. Avoid fried foods. Limit sugar. Do your best to avoid all sweetened beverages, such as alcohol, juice, sodas or sweet teas, drink water, unsweet tea, diet soda, or crystal light as options instead. (Don't drink more than 2 of the 12oz artificially sweetened drinks per day as these can increase hunger and make it more difficult to lose weight. The daily goal for water intake should be at least 64 oz/day for most people. Daily exercise will also help with weight loss and overall health. A minimum of 150 minutes a week of moderate exercise is recommended (30 minutes per day). Make exercise a routine part of your day - for example, park in spaces far away from [facility], take stairs instead of elevator, if sitting for long periods, get up from chair and walk every hour. Recruit a friend or relative to exercise with you and keep you on schedule. It is much easier to exercise with a jose who will make sure you work out each day! Home DVDs can be a great way to work out, if you will do them (otherwise, they aren't worth the money!) Prisync is a eight week mixed martial arts (MMA) based workout. It has 5 main workout DVDs, each one is 45 minutes consisting of a 10 minute warm-up, five 5 minute workouts and a 6 minute stretching/cool down. It is easy to follow, with options to modify each move and you only need hand weights and some space to do the work out. Meal planning smart phone applications like [employer] can help plan healthy meals. Get 7-8 hours of sleep each night. For reliable dietary information, go to www. EATRIGHT.org. 
 
You may wish to consider seeing the nutritionist at [facility] ([phone]), [facility] ([phone]) or [facility] ([phone]). Alternatively, you can dial [employer] at [phone] (Monday - Friday 9am - 5pm) for a complimentary (free) conversation about your diet. Meal plans, grocery list and tips for healthy eating can be sent to you upon request.  
 
Free smart phone application to help manage weight loss: MyFitnessPal = tracks food intake, exercise and weight. Daily nutritional summary. Meal  2)  Please reduce alcohol intake to no more than 2 servings of alcohol a day (one servin oz beer, 5 oz wine, 1.5 oz distilled spirits (80 proof). 3) Think About Qutting!! 
? Smoking causes 480,000 deaths a year ? All tobacco products are harmful and addictive  none are safe 
? Nicotine is addictive so it is hard to quit. Medications and changes in your daily activities can double the chance of your success ? In teenage brain, nicotine affects how brain develops, increasing sensitivity in alcohol and drug receptors permanently which means teens can get addicted more easily than adults ? VAPING IS NOT ANY BETTER FOR YOU THAN SMOKING!! In fact, it can be more harmful as the vaporization bypasses many of the body's natural defense mechanisms. The flavor additives also increase esnsitivity in alcohol and drug receptors permanently This is the first step to a healthier you! 
o You will feel better 
o 3 days after quitting: you can breathe easier; your blood pressure drops; food tastes better and you can smell better 
o 1 year after quitting, your risk of heart attack and stroke is cut in half! 
o You will look better 
o your breath, clothes and hair will smell better 
o your teeth will get whiter and your fingers and fingernails wont look yellow 
o You will have more money 
o if you smoke 1 pack per day, you spend $2,117 a year on cigarettes; 5 years of buying cigarettes is $10,585 up in smoke! 
o You will set a good example for your children and grandchildren Understand the reasons you may smoke 
o Your routine  do you smoke after eating, drinking coffee or alcohol? 
o if so, change your routine  get up from table after eating, switch to drinking tea instead of coffee 
o avoid alcohol for awhile  it lowers your chance of success 
o go to public places where smoking is not allowed  
o Your emotions  are you stressed, angry, sad, bored? 
o talk to a healthcare provider about help for depression or anxiety 
o take a walk if you are bored or angry; squeeze a tennis ball for stress 
o Your habits  do you need having something in your mouth/hands? o carrot sticks, sugar free hard candies, gum, toothpicks, and pretzel sticks can help. You can inhale and chew on cinnamon sticks You can do it! o Make a plan: 
o Decide on a date when you will stop smoking  write it on your calendar 
o Tell your friends and family. ? Ask them to help you by not smoking around you and help get your mind off smoking (walking, fishing, exercising are some examples) o Drink lots of water and try not to drink alcohol, coffee or other things that make you want to smoke 
o Remove ashtrays, cigarettes and other tobacco products from your home, work and car 
o People try to quit a few times before they can, so dont give up if you slip up! AFTER 20 MINUTES OF NOT SMOKING: 
Your blood pressure and heart rate decrease AFTER 24 HOURS OF NOT SMOKING: 
Your risk of heart attack decreases AFTER 48 HOURS OF NOT SMOKING: 
Your nerve endings start to regrow; your smell and taste will return; The carbon monoxide level in your blood may return to normal and thus make it easier for blood to carry oxygen to your tissues. AFTER 2 WEEKS OF NOT SMOKING: 
Your lung function and blood circulation improve AFTER 1 YEAR OF NOT SMOKING: 
Your risk of heart disease drops to 1/2 that of a smoker AFTER 5 YEARS OF NOT SMOKING: 
Your risk of bladder cancer may be reduced by 50% AFTER 10 YEARS OF NOT SMOKING: 
Your risk of lung cancer is 50-70% less than the risk of a someone who is still smoking; Your risk of cancer of the mouth, throat, esophagus, kidney and pancreas may be reduced by up to 50% and your risk of ulcers decrease AFTER 15 YEARS OF NOT SMOKING: 
Your risk of heart disease, stroke and death is similar to that of someone who never smoked. SMOKERS WHO QUIT AT ANY AGE LIVE LONGER THAN THOSE WHO DON'T. 4) Labs The following blood work was ordered today. Once the tests are completed, you will receive a letter, email or phone call with the results.   If you have not heard from us within 10-14 days, please call the office for the results. Complete Blood Count (CBC) helps evaluate your overall health, including hemoglobin and red blood cells that carry oxygen, white blood cells that fight infection and platelets that help with clotting. Complete Metabolic Panel (CMP) assesses electrolytes, kidney and liver function.  (A Basic Metabolic Panel (BMP) does not include liver function.) Electrolytes include sodium, potassium, calcium, and chloride. These are necessary for cell function and an imbalance can cause serious problems. BUN and creatinine are markers of kidney function. ALT and AST are markers of liver function. Hemoglobin A1c is a 3 month average of your blood sugar and used as a marker of your diabetes control. A normal value is less than 5.7%. Total Cholesterol is the total number of cholesterol particles in your blood, which includes triglycerides, HDL and LDL. A small amount of cholesterol is needed for the body's cells, hormones, and metabolism. Goal is less than 200. Triglycerides are the short term fats in your blood which are used for energy. Goal is less than 150. High Density Lipids (HDL) is the \"good\" cholesterol in your blood. HDL helps remove bad cholesterol from your blood. Goal is more than 40. Low Density Lipids (LDL) is the \"bad\" cholesterol in your blood. LDL can stick to your arteries, raising the risk for heart attack and stroke. Goal is less than 100 Urinalysis - this is a test of your urine to check your overall health. It is recommended as a part of a routine check up and screens for a variety of disorders, such as urinary tract infections, kidney disease and diabetes. Your thyroid is responsible for secreting hormones and regulating your metabolism. Thyroid Stimulating Hormone (TSH) is a test for thyroid function.   TSH is a hormone made by the pituitary gland in the brain and tells your thyroid gland to make hormones and release them into your blood. If your thyroid isn't producing enough hormone, you may have symptoms such as unexplained weight gain, fatigue, hair loss. If your thyroid is producing too much hormone, you may have symptoms of diarrhea, heart palpitations, fatigue, unexplained weight loss. A normal TSH value is between 0.45 - 4.5. Urine Analysis for Microalbumin/creatinine ratio is a marker of the amount of protein in your urine. Certain health conditions, such as diabetes and hypertension, can injury kidney function. A normal value is less than 30. Vitamin D is important for absorbing calcium and promoting bone growth. If you are low in Vitamin D, you may experience fatigue, back or bone pain, hair loss, muscle pain, slow wound healing, depression. Your body can make Vitamin D after exposure to sunlight or through foods like fatty fish (tuna, mackerel, salmon), food with Vitamin D added (dairy products, orange juice and cereals) and cheese, egg yolks and liver. Vitamin D decreases with age and in the winter time when the sun is not strong. A deficiency in Vitamin D has been linked to certain cancers (breast, prostate, colon) as well as heart disease, depression and weight gain. 5) Cerumen (ear wax) is a natural lubricating and protective substance secreted in the ear canal. If you have a build up of ear wax, you can use an over the counter (OTC) product called Debrox to help remove the ear wax. Debrox comes in a yellow and green box and can be purchased at most drug stores. It works by releasing oxygen in the ear, allowing the solution to foam and soften and loosen the wax. Place 5-7 drops in the ear canal before going to bed at night for 2-4 nights.   You can use a washcloth wrapped around your finger to gently removed ear wax as it exits the ear canal.  
 
Do NOT use Q-tips to clean your ears as this  pushes it further into the ear canal and packs the ear wax. 6) Take 1 tab of 20mg lasix per day for 5 days to reduce lower leg swelling. Please make sure you are drinking at least 64oz of water daily. Lower leg swelling (edema) happens when fluid collects in small spaces around tissues and organs inside the body. Swelling in the legs, hands, and belly can be uncomfortable and can be a symptom of a more serious condition. Swelling in the lungs can be life-threatening, because it is usually a symptom of a serious heart problem. Lower leg edema is particularly frequent in older adults, and is usually chronic. It is often due to chronic venous disease, heart failure, medications, or pulmonary hypertension. Treatment of edema includes several components: 
 
Reduce salt (sodium) in your diet  Sodium, which is found in table salt and processed foods, can worsen edema. Reducing the amount of salt you consume can help to reduce edema, especially if you also take a diuretic. Compression stockings - apply pressure at the ankle and gradually decrease the pressure up the leg. These stockings are available with varying degrees of compression. ? Stockings with small amounts of compression can be purchased at pharmacies and surgical supply stores without a prescription. ? People with moderate to severe edema, those on their feet a lot, and those with ulcers usually require prescription stockings. ? The white \"antiembolism\" stockings commonly given in the hospital do not apply enough pressure at the ankle and are not adequate treatment for edema. Body positioning  Leg, ankle, and foot edema can be improved by elevating the legs above heart level for 30 minutes three or four times per day. Elevating the legs may be sufficient to reduce or eliminate edema for people with mild venous disease. Medications:  Diuretics are a type of medication that causes the kidneys to excrete more water and sodium, which can reduce edema.  Diuretics must be used with care because removing too much fluid too quickly can lower the blood pressure, cause lightheadedness or fainting, and impair kidney function. 7) Shingles Vaccine - Shingrix Herpes Zoster (Shingles) Herpes zoster (shingles) is a painful rash caused by the same virus that causes chickenpox. After an episode of chickenpox, the virus retreats to cells of the nervous system, where it can reside quietly for decades. However, later in life, the varicella-zoster virus can become active again. When it reactivates, it causes shingles. Shingles can affect people of all ages. It is particularly common in adults over age 48 years. It is also more common in individuals of all ages with conditions that weaken the immune system. Pain is usually the first sign of shingles. Other symptoms include: fever, chills, headache, numbness, tingling and/or burning pain and a skin rash. The rash can appear anywhere on your body, but most commonly on the torso. It is usually on only 1 side of your body, in a band or beltlike pattern. During the first several days, small, painful blisters appear in the area. Scarring may occur where the blisters appear and there may be continued sensitivity and pain in that patch of skin for months after the infection. Treatment:  
There is no cure for shingles - it is usually self-limiting. However, anti-viral medications can reduce the spread of the rash, speed healing and decrease the risk of complications. Supportive Treatment:  
1)  Tylenol or ibuprofen can be used to reduce pain 2) Colloidal oatmeal bath or wet cool compresses may help with itching. Make a solution of cool water and cornstarch, baking soda, or Aveeno Oatmeal.  Apply the solution as a compress to the area. This will soothe the skin. 3) Wash the skin with soap and water to keep rash free of infection. 4) Reduce stress - exercise, yoga, healthy diet THE BLISTER FLUID IS CONTAGIOUS TO ANYONE WHO HAS NOT ALREADY HAD CHICKEN POX. Stay home from work or school until all blisters have formed a crust (usually 2 weeks after the illness begins) and you are no longer contagious. Prevention: The CDC recommends everyone over the age of 61 receive the shingles vaccine. This is recommended for people who have already had a shingles outbreak as it could prevent future occurrences of the disease. According to the CDC, it is also recommended for people born before [address] in the [facility] who have not had varicella (chicken pox) as they are considered immune. [facility] is a vaccine indicated for prevention of herpes zoster (shingles) in adults aged 48 years and older and is the preferred vaccine for preventing shingles and related complications The Center for Disease Control and Prevention recommended Shingrix for the following: 
- healthy adults aged 48 years and older to prevent shingles and related complications 
- adults who previously received the current shingles vaccine (Zostavax®) to prevent shingles and related complications Two doses (0.5 mL each) are needed for full efficacy. The vaccines are administered intramuscularly, with the second one administered 2-6 months after the initial vaccine. 8) Blood pressure is elevated. Please return in 4 weeks for blood pressure and weight check Please check your blood pressure through the week at staggered times in the day. (If you check in the morning, it should be at least one hour after your morning blood pressure medications.) Arm monitors are most accurate. If you use a wrist monitor, make sure your wrist is at heart level. You can bring your home monitor to your next visit and have it calibrated with the machine in the office to gauge your readings. Sit  with your feet uncrossed and relax for 5 minutes before taking your BP. Keep a written record of your blood pressure readings and bring it to each appointment. If your systolic (top) blood pressure is consistently greater than 140mmHg or less than [id number], OR the diastolic (bottom) number is consistently greater than 90mmHg or less than 60mmHg, then please schedule an office appointment. Work on healthy eating - no salt diet, more potassium (helps flush out sodium,making healthier heart and arteries) - and incorporating daily exercise into your routine. Losing weight can help reduce your blood pressure! Cardiac symptoms that would need immediate attention include: uncomfortable pressure, squeezing, fullness or pain in the center of your chest. Pain or discomfort in one or both arms, the back, neck, jaw or stomach. Shortness of breath with or without chest discomfort, breaking out in a cold sweat, nausea or lightheadedness. 9) The [facility] Preventive Service Task Force (USPSTF) recommends annual screening for lung cancer with low-dose computed tomography (LDCT) in adults aged 54 to 80 years who have a 30 pack-year smoking history and currently smoke or have quit within the past 15 years. Screening is discontinued once a person has not smoked for 15 years or develops a health problem that substantially limits life expectancy or the ability or willingness to have curative lung surgery. 10 Pneumovax 23 today Influenza vaccine. The influenza virus mutates (changes) each year, so a new vaccine is necessary every fall, before flu season The flu season in [address] usually starts in [address], so I recommend getting the vaccine in October as the vaccine immunity only lasts 3-4 months. If you get it too early, it will wear off before our flu season starts. However, if you have underlying conditions that make you part of the vulnerable population or if you hear the flu is here early, then go ahead and get vaccine. It takes 2 weeks for vaccine to be fully effective.  People over 72years old get a high dose flu vaccine and cannot do the nasal vaccine. Side effects can include pain, redness or swelling at the injection site, headache, muscle ache and malaise, and typically resolve with 1 to 3 days. Well Visit, Men 48 to 72: Care Instructions Your Care Instructions Physical exams can help you stay healthy. Your doctor has checked your overall health and may have suggested ways to take good care of yourself. He or she also may have recommended tests. At home, you can help prevent illness with healthy eating, regular exercise, and other steps. Follow-up care is a key part of your treatment and safety. Be sure to make and go to all appointments, and call your doctor if you are having problems. It's also a good idea to know your test results and keep a list of the medicines you take. How can you care for yourself at home? · Reach and stay at a healthy weight. This will lower your risk for many problems, such as obesity, diabetes, heart disease, and high blood pressure. · Get at least 30 minutes of exercise on most days of the week. Walking is a good choice. You also may want to do other activities, such as running, swimming, cycling, or playing tennis or team sports. · Do not smoke. Smoking can make health problems worse. If you need help quitting, talk to your doctor about stop-smoking programs and medicines. These can increase your chances of quitting for good. · Protect your skin from too much sun. When you're outdoors from 10 a.m. to 4 p.m., stay in the shade or cover up with clothing and a hat with a wide brim. Wear sunglasses that block UV rays. Even when it's cloudy, put broad-spectrum sunscreen (SPF 30 or higher) on any exposed skin. · See a dentist one or two times a year for checkups and to have your teeth cleaned. · Wear a seat belt in the car. Follow your doctor's advice about when to have certain tests. These tests can spot problems early. · Cholesterol. Your doctor will tell you how often to have this done based on your overall health and other things that can increase your risk for heart attack and stroke. · Blood pressure. Have your blood pressure checked during a routine doctor visit. Your doctor will tell you how often to check your blood pressure based on your age, your blood pressure results, and other factors. · Prostate exam. Talk to your doctor about whether you should have a blood test (called a PSA test) for prostate cancer. Experts recommend that you discuss the benefits and risks of the test with your doctor before you decide whether to have this test. 
· Diabetes. Ask your doctor whether you should have tests for diabetes. · Vision. Some experts recommend that you have yearly exams for glaucoma and other age-related eye problems starting at age 48. · Hearing. Tell your doctor if you notice any change in your hearing. You can have tests to find out how well you hear. · Colorectal cancer. Your risk for colorectal cancer gets higher as you get older. Some experts say that adults should start regular screening at age 48 and stop at age 76. Others say to start before age 48 or continue after age 76. Talk with your doctor about your risk and when to start and stop screening. · Heart attack and stroke risk. At least every 4 to 6 years, you should have your risk for heart attack and stroke assessed. Your doctor uses factors such as your age, blood pressure, cholesterol, and whether you smoke or have diabetes to show what your risk for a heart attack or stroke is over the next 10 years. · Abdominal aortic aneurysm. Ask your doctor whether you should have a test to check for an aneurysm. You may need a test if you ever smoked or if your parent, brother, sister, or child has had an aneurysm. When should you call for help?  
Watch closely for changes in your health, and be sure to contact your doctor if you have any problems or symptoms that concern you. Where can you learn more? Go to http://www.gray.com/ Enter D043 in the search box to learn more about \"Well Visit, Men 48 to 72: Care Instructions. \" Current as of: May 27, 2020               Content Version: 12.6 © 1741-8946 [employer]. Care instructions adapted under license by [employer] (which disclaims liability or warranty for this information). If you have questions about a medical condition or this instruction, always ask your healthcare professional. [address] any warranty or liability for your use of this information. Vaccine Information Statement Pneumococcal Polysaccharide Vaccine (PPSV23): What You Need to Know Many Vaccine Information Statements are available in Italian and other languages. See www.immunize.org/vis Hojas de información sobre vacunas están disponibles en español y en muchos otros idiomas. Visite www.immunize.org/vis 1. Why get vaccinated? Pneumococcal polysaccharide vaccine (PPSV23) can prevent pneumococcal disease. Pneumococcal disease refers to any illness caused by pneumococcal bacteria. These bacteria can cause many types of illnesses, including pneumonia, which is an infection of the lungs. Pneumococcal bacteria are one of the most common causes of pneumonia. Besides pneumonia, pneumococcal bacteria can also cause: 
 Ear infections  Sinus infections  Meningitis (infection of the tissue covering the brain and spinal cord)  Bacteremia (bloodstream infection) Anyone can get pneumococcal disease, but children under 3years of age, people with certain medical conditions, adults 72 years or older, and cigarette smokers are at the highest risk. Most pneumococcal infections are mild. However, some can result in long-term problems, such as brain damage or hearing loss.  Meningitis, bacteremia, and pneumonia caused by pneumococcal disease can be fatal.  
 
2. PPSV23  
 
PPSV23 protects against 23 types of bacteria that cause pneumococcal disease. PPSV23 is recommended for:  All adults 72 years or older,  Anyone 2 years or older with certain medical conditions that can lead to an increased risk for pneumococcal disease. Most people need only one dose of PPSV23. A second dose of PPSV23, and another type of pneumococcal vaccine called PCV13, are recommended for certain high-risk groups. Your health care provider can give you more information. People 65 years or older should get a dose of PPSV23 even if they have already gotten one or more doses of the vaccine before they turned 72. 
 
3. Talk with your health care provider Tell your vaccine provider if the person getting the vaccine: 
 Has had an allergic reaction after a previous dose of PPSV23, or has any severe, life-threatening allergies. In some cases, your health care provider may decide to postpone PPSV23 vaccination to a future visit. People with minor illnesses, such as a cold, may be vaccinated. People who are moderately or severely ill should usually wait until they recover before getting PPSV23. Your health care provider can give you more information. 4. Risks of a vaccine reaction  Redness or pain where the shot is given, feeling tired, fever, or muscle aches can happen after PPSV23. People sometimes faint after medical procedures, including vaccination. Tell your provider if you feel dizzy or have vision changes or ringing in the ears. As with any medicine, there is a very remote chance of a vaccine causing a severe allergic reaction, other serious injury, or death. 5. What if there is a serious problem? An allergic reaction could occur after the vaccinated person leaves the clinic.  If you see signs of a severe allergic reaction (hives, swelling of the face and throat, difficulty breathing, a fast heartbeat, dizziness, or weakness), call 9-1-1 and get the person to the nearest hospital. 
 
For other signs that concern you, call your health care provider. Adverse reactions should be reported to the Vaccine Adverse Event Reporting System (VAERS). Your health care provider will usually file this report, or you can do it yourself. Visit the VAERS website at www.vaers. [facility].gov or call 2-297.835.1674. VAERS is only for reporting reactions, and VAERS staff do not give medical advice. 6. How can I learn more?  Ask your health care provider.  Call your local or state health department.  Contact the Centers for Disease Control and Prevention (CDC): 
- Call 4-657.706.5127 (1-800-CDC-INFO) or 
- Visit CDCs website at www.cdc.gov/vaccines Vaccine Information Statement PPSV23  
10/30/2019 Department of [facility] and [employer] Centers for Disease Control and Prevention Office Use Only

## 2020-11-08 RX ORDER — ERGOCALCIFEROL 1.25 MG/1
50000 CAPSULE ORAL
Qty: 12 CAP | Refills: 0 | Status: SHIPPED | OUTPATIENT
Start: 2020-11-08

## 2020-11-09 NOTE — PROGRESS NOTES
D= 15, rx 50kunits q7days, x12wks; Cr = 1.40, microalb 1000+, proteinuria, Na = 125. Referral to nephrology. Result ltr mailed.

## 2020-11-10 NOTE — PROGRESS NOTES
INDIA 11/10/20 @0753 - spoke to pt regarding abn lab values. Gave pt Dr. Janyth Gottron contact info and advised to make appt, reduce water, monitor for sx of hyponatremia and increase salt in diet until he sees Dr. Janyth Gottron. Pt verbalizes understanding and agrees to the plan of care.

## 2021-01-01 DIAGNOSIS — I10 ESSENTIAL HYPERTENSION WITH GOAL BLOOD PRESSURE LESS THAN 140/90: ICD-10-CM

## 2021-01-01 RX ORDER — METOPROLOL SUCCINATE 50 MG/1
50 TABLET, EXTENDED RELEASE ORAL DAILY
Qty: 90 TAB | Refills: 0 | Status: SHIPPED | OUTPATIENT
Start: 2021-01-01 | End: 2021-03-29 | Stop reason: SDUPTHER

## 2021-03-29 DIAGNOSIS — I10 ESSENTIAL HYPERTENSION WITH GOAL BLOOD PRESSURE LESS THAN 140/90: ICD-10-CM

## 2021-03-29 RX ORDER — METOPROLOL SUCCINATE 50 MG/1
50 TABLET, EXTENDED RELEASE ORAL DAILY
Qty: 90 TAB | Refills: 0 | Status: SHIPPED | OUTPATIENT
Start: 2021-03-29

## 2021-03-29 RX ORDER — AMLODIPINE AND BENAZEPRIL HYDROCHLORIDE 5; 40 MG/1; MG/1
1 CAPSULE ORAL DAILY
Qty: 90 CAP | Refills: 0 | Status: SHIPPED | OUTPATIENT
Start: 2021-03-29

## 2021-03-29 NOTE — TELEPHONE ENCOUNTER
Last visit 11/06/2020 [facility]   Next appointment Nothing scheduled   Previous refill encounter(s)  01/01/2021 Toprol-XL #90,   11/06/2020 Lotrel #90     Requested Prescriptions     Pending Prescriptions Disp Refills    amLODIPine-benazepril (LOTREL) 5-40 mg per capsule 90 Cap 0     Sig: Take 1 Cap by mouth daily. Indications: high blood pressure    metoprolol succinate (TOPROL-XL) 50 mg XL tablet 90 Tab 0     Sig: Take 1 Tab by mouth daily.  Indications: high blood pressure

## 2022-03-19 PROBLEM — I10 ELEVATED BLOOD PRESSURE READING IN OFFICE WITH DIAGNOSIS OF HYPERTENSION: Status: ACTIVE | Noted: 2017-10-09

## 2023-05-12 RX ORDER — ERGOCALCIFEROL 1.25 MG/1
CAPSULE ORAL
COMMUNITY
Start: 2020-11-08

## 2023-05-12 RX ORDER — BLOOD PRESSURE TEST KIT
1 KIT MISCELLANEOUS DAILY
COMMUNITY
Start: 2020-10-05

## 2023-05-12 RX ORDER — METOPROLOL SUCCINATE 50 MG/1
TABLET, EXTENDED RELEASE ORAL DAILY
COMMUNITY
Start: 2021-03-29

## 2023-05-12 RX ORDER — AMLODIPINE BESYLATE AND BENAZEPRIL HYDROCHLORIDE 5; 40 MG/1; MG/1
1 CAPSULE ORAL DAILY
COMMUNITY
Start: 2021-03-29

## 2023-05-12 RX ORDER — IBUPROFEN 200 MG
TABLET ORAL PRN
COMMUNITY

## 2025-03-21 ENCOUNTER — OFFICE VISIT (OUTPATIENT)
Age: 65
End: 2025-03-21

## 2025-03-21 VITALS
HEART RATE: 84 BPM | DIASTOLIC BLOOD PRESSURE: 90 MMHG | SYSTOLIC BLOOD PRESSURE: 166 MMHG | OXYGEN SATURATION: 96 % | WEIGHT: 140.1 LBS | RESPIRATION RATE: 16 BRPM | TEMPERATURE: 97.7 F

## 2025-03-21 DIAGNOSIS — Z76.0 ENCOUNTER FOR MEDICATION REFILL: Primary | ICD-10-CM

## 2025-03-21 DIAGNOSIS — I10 HYPERTENSION, UNSPECIFIED TYPE: ICD-10-CM

## 2025-03-21 RX ORDER — AMLODIPINE BESYLATE 10 MG/1
10 TABLET ORAL DAILY
Qty: 30 TABLET | Refills: 0 | Status: SHIPPED | OUTPATIENT
Start: 2025-03-21

## 2025-03-21 RX ORDER — AMLODIPINE BESYLATE 10 MG/1
10 TABLET ORAL DAILY
COMMUNITY
Start: 2024-12-17 | End: 2025-03-21 | Stop reason: SDUPTHER

## 2025-03-21 RX ORDER — METOPROLOL SUCCINATE 50 MG/1
50 TABLET, EXTENDED RELEASE ORAL DAILY
Qty: 30 TABLET | Refills: 0 | Status: SHIPPED | OUTPATIENT
Start: 2025-03-21

## 2025-03-21 ASSESSMENT — ENCOUNTER SYMPTOMS
NAUSEA: 0
SORE THROAT: 0
SHORTNESS OF BREATH: 0
VOMITING: 0
CHEST TIGHTNESS: 0
COUGH: 0
ABDOMINAL PAIN: 0
RHINORRHEA: 0

## 2025-03-21 NOTE — PROGRESS NOTES
Behavior normal.            Assessment/ Plan:     Test Results:  No results found for this or any previous visit (from the past 6 hours).       Diagnosis Orders   1. Encounter for medication refill        2. Hypertension, unspecified type  amLODIPine (NORVASC) 10 MG tablet    metoprolol succinate (TOPROL XL) 50 MG extended release tablet    [facility]        Impression:  Refilled patient's blood pressure medications to the pharmacy of his choice.  Submitted a referral to internal medicine so the patient can establish care with a new primary care provider.  OTC for symptom management. Increase fluid intake, ensure adequate nutritional intake.  Follow up with PCP for any further refills.  Go to ED with development of any acute symptoms.     Follow up:  Follow up immediately for any new, worsening or changes or if symptoms are not improving over the next 5-7 days.         GABRIELLA Mendez - NP